# Patient Record
Sex: MALE | Race: WHITE | NOT HISPANIC OR LATINO | Employment: FULL TIME | RURAL
[De-identification: names, ages, dates, MRNs, and addresses within clinical notes are randomized per-mention and may not be internally consistent; named-entity substitution may affect disease eponyms.]

---

## 2023-01-23 ENCOUNTER — OFFICE VISIT (OUTPATIENT)
Dept: FAMILY MEDICINE CLINIC | Facility: CLINIC | Age: 28
End: 2023-01-23
Payer: COMMERCIAL

## 2023-01-23 VITALS
OXYGEN SATURATION: 100 % | HEART RATE: 102 BPM | SYSTOLIC BLOOD PRESSURE: 110 MMHG | DIASTOLIC BLOOD PRESSURE: 60 MMHG | BODY MASS INDEX: 30.87 KG/M2 | HEIGHT: 69 IN | RESPIRATION RATE: 16 BRPM | TEMPERATURE: 97.1 F | WEIGHT: 208.4 LBS

## 2023-01-23 DIAGNOSIS — Z00.00 ANNUAL PHYSICAL EXAM: Primary | ICD-10-CM

## 2023-01-23 DIAGNOSIS — R89.9 ABNORMAL LABORATORY TEST: ICD-10-CM

## 2023-01-23 DIAGNOSIS — Z11.59 ENCOUNTER FOR HEPATITIS C SCREENING TEST FOR LOW RISK PATIENT: ICD-10-CM

## 2023-01-23 DIAGNOSIS — G43.101 MIGRAINE WITH AURA AND WITH STATUS MIGRAINOSUS, NOT INTRACTABLE: ICD-10-CM

## 2023-01-23 PROCEDURE — 99385 PREV VISIT NEW AGE 18-39: CPT | Performed by: STUDENT IN AN ORGANIZED HEALTH CARE EDUCATION/TRAINING PROGRAM

## 2023-01-23 PROCEDURE — 99203 OFFICE O/P NEW LOW 30 MIN: CPT | Performed by: STUDENT IN AN ORGANIZED HEALTH CARE EDUCATION/TRAINING PROGRAM

## 2023-01-23 RX ORDER — SUMATRIPTAN 50 MG/1
TABLET, FILM COATED ORAL
Qty: 10 TABLET | Refills: 2 | Status: SHIPPED | OUTPATIENT
Start: 2023-01-23

## 2023-01-23 NOTE — PROGRESS NOTES
Chief Complaint   Patient presents with   • Annual Exam     Subjective   Clifford Calhoun is a 27 y.o. male here for his annual physical with me.   Clifford is here for coordination of medical care, to discuss health maintenance, disease prevention as well as to followup on medical problems.     Annual Physical Exam  Patient's last PE was 7 years ago.  Patient's medical history, medication list and allergy list were reviewed and updated  Activity level is heavy.   Exercises 7 per week.   Appetite is good.   Feels well with few complaints.   Energy level is fair.   Sleeps fairly well.   Patient's last colonoscopy was never.   Patient is doing routine self skin exam never.   Patient is doing routine testicle exams never.  -Patient's mother found out she is a carrier for hemochromatosis and is concerned for patient.  Mother wanted to make sure patient does not have hemochromatosis    Frequent migraines/headaches  - gets them on occasion, gets tunnel vision then gets a strong headache after that doesn't stop. Gets lightheadedness  - gets headaches often: several times a week  -Discussed doing a daily preventative for migraines versus as needed abortive treatment.  Patient preferred abortive treatment for now    The following portions of the patient's history were reviewed and updated as appropriate: allergies, current medications, past family history, past medical history, past social history, past surgical history and problem list.      Past Medical History :  Active Ambulatory Problems     Diagnosis Date Noted   • No Active Ambulatory Problems     Resolved Ambulatory Problems     Diagnosis Date Noted   • No Resolved Ambulatory Problems     No Additional Past Medical History       Medication List:    Current Outpatient Medications:   •  SUMAtriptan (Imitrex) 50 MG tablet, Take one tablet at onset of headache. May repeat dose one time in 2 hours if headache not relieved. Do not take more then 2 tablets in 24 hours, Disp: 10  "tablet, Rfl: 2    Allergies:  No Known Allergies    Social History:  Social History     Socioeconomic History   • Marital status: Single   Tobacco Use   • Smoking status: Never   • Smokeless tobacco: Never   Vaping Use   • Vaping Use: Never used   Substance and Sexual Activity   • Alcohol use: Yes     Comment: 1x week, 6 drinks per sitting   • Drug use: Never   • Sexual activity: Not Currently       Family History:  Family History   Problem Relation Age of Onset   • Migraine headaches Mother    • Hypertension Paternal Grandfather    • Diabetes Other        Past Surgical History:  Past Surgical History:   Procedure Laterality Date   • ANKLE SURGERY Right     x3 for tendon repair       PHQ-2 Depression Screening  Little interest or pleasure in doing things?     Feeling down, depressed, or hopeless?     PHQ-2 Total Score       Health Maintenance   Topic Date Due   • HEPATITIS C SCREENING  Never done   • COVID-19 Vaccine (1) 01/25/2023 (Originally 1995)   • INFLUENZA VACCINE  03/31/2023 (Originally 8/1/2022)   • ANNUAL PHYSICAL  01/23/2024   • TDAP/TD VACCINES (2 - Td or Tdap) 01/04/2031   • Pneumococcal Vaccine 0-64  Aged Out       Immunization History   Administered Date(s) Administered   • Tdap 01/04/2021         Review Of Systems:  Review of Systems      Physical Exam:  Vital Signs:  /60 (BP Location: Right arm, Patient Position: Sitting, Cuff Size: Large Adult)   Pulse 102   Temp 97.1 °F (36.2 °C) (Skin)   Resp 16   Ht 175.3 cm (69\")   Wt 94.5 kg (208 lb 6.4 oz)   SpO2 100%   BMI 30.78 kg/m²     Physical Exam  Constitutional:       General: He is not in acute distress.     Appearance: Normal appearance.   HENT:      Head: Normocephalic and atraumatic.      Right Ear: Tympanic membrane normal.      Left Ear: Tympanic membrane normal.      Nose: Nose normal.      Mouth/Throat:      Mouth: Mucous membranes are moist.      Pharynx: Oropharynx is clear. No posterior oropharyngeal erythema.   Eyes:      " Extraocular Movements: Extraocular movements intact.      Conjunctiva/sclera: Conjunctivae normal.   Cardiovascular:      Rate and Rhythm: Normal rate and regular rhythm.      Heart sounds: Normal heart sounds.   Pulmonary:      Effort: Pulmonary effort is normal.      Breath sounds: Normal breath sounds. No stridor. No wheezing.   Abdominal:      General: Abdomen is flat. Bowel sounds are normal.      Palpations: Abdomen is soft. There is no mass.      Tenderness: There is no abdominal tenderness. There is no guarding.   Musculoskeletal:         General: No swelling or deformity. Normal range of motion.      Cervical back: Normal range of motion and neck supple.   Skin:     General: Skin is warm and dry.      Capillary Refill: Capillary refill takes less than 2 seconds.      Coloration: Skin is not jaundiced.      Findings: No rash.   Neurological:      General: No focal deficit present.      Mental Status: He is alert and oriented to person, place, and time.      Cranial Nerves: No cranial nerve deficit.      Motor: No weakness.      Coordination: Coordination normal.      Gait: Gait normal.      Deep Tendon Reflexes: Reflexes normal.   Psychiatric:         Mood and Affect: Mood normal.         Behavior: Behavior normal.         Thought Content: Thought content normal.           Assessment and Plan:  Diagnoses and all orders for this visit:    1. Annual physical exam (Primary)  -Normal 27-year-old male physical exam    2. Migraine with aura and with status migrainosus, not intractable  -     SUMAtriptan (Imitrex) 50 MG tablet; Take one tablet at onset of headache. May repeat dose one time in 2 hours if headache not relieved. Do not take more then 2 tablets in 24 hours  Dispense: 10 tablet; Refill: 2  -Discussed daily preventative treatment versus abortive treatment.  Patient preferred to try abortive treatment for now.   -If patient is having to take sumatriptan hand every other day or several times a week,  recommended that we start amitriptyline daily.  Patient verbalized understanding    3. Abnormal laboratory test  -     CBC & Differential  -     Comprehensive Metabolic Panel  -     Iron Profile and ferritin to more confidently screen for possible hemochromatosis    4. BMI 30.0-30.9,adult  -     Lipid Panel With / Chol / HDL Ratio  -Patient exercises regularly and has larger amounts of muscle mass and low body fat.  BMI of 30.78 likely due to muscle mass rather than excess calories    5. Encounter for hepatitis C screening test for low risk patient  -     Hepatitis C Antibody      Follow-up  -1 year for annual physical    Discussed screening for common diseases.  Recommend yearly eye exams. Also discussed monitoring of blood pressure, lipids, blood sugars.      Colon cancer screening options discussed, including Colonoscopy, Cologuard and Hemoccult cards.  Occult blood testing negative in office today.  He was given additional information on the Cologuard testing and can contact the office if order desired.     I wore protective equipment throughout this patient encounter to include mask and eye protection. Hand hygiene was performed before donning protective equipment and after removal when leaving the room.

## 2023-01-26 ENCOUNTER — PATIENT ROUNDING (BHMG ONLY) (OUTPATIENT)
Dept: FAMILY MEDICINE CLINIC | Facility: CLINIC | Age: 28
End: 2023-01-26
Payer: COMMERCIAL

## 2023-01-26 NOTE — PROGRESS NOTES
January 26, 2023    Hello, may I speak with Clifford Calhoun?    My name is Jeanette      I am  with [facility]  [address]  [place] [address].    Before we get started may I verify your date of birth? 1995    I am calling to officially welcome you to our practice and ask about your recent visit. Is this a good time to talk? yes    Tell me about your visit with us. What things went well?  great visit       We're always looking for ways to make our patients' experiences even better. Do you have recommendations on ways we may improve?  no    Overall were you satisfied with your first visit to our practice? yes       I appreciate you taking the time to speak with me today. Is there anything else I can do for you? no      Thank you, and have a great day.

## 2023-04-30 LAB
ALBUMIN SERPL-MCNC: 4.6 G/DL (ref 4.1–5.2)
ALBUMIN/GLOB SERPL: 2.3 {RATIO} (ref 1.2–2.2)
ALP SERPL-CCNC: 45 IU/L (ref 44–121)
ALT SERPL-CCNC: 27 IU/L (ref 0–44)
AST SERPL-CCNC: 30 IU/L (ref 0–40)
BASOPHILS # BLD AUTO: 0 X10E3/UL (ref 0–0.2)
BASOPHILS NFR BLD AUTO: 0 %
BILIRUB SERPL-MCNC: 1.7 MG/DL (ref 0–1.2)
BUN SERPL-MCNC: 17 MG/DL (ref 6–20)
BUN/CREAT SERPL: 18 (ref 9–20)
CALCIUM SERPL-MCNC: 9.2 MG/DL (ref 8.7–10.2)
CHLORIDE SERPL-SCNC: 105 MMOL/L (ref 96–106)
CHOLEST SERPL-MCNC: 127 MG/DL (ref 100–199)
CHOLEST/HDLC SERPL: 2 RATIO (ref 0–5)
CO2 SERPL-SCNC: 24 MMOL/L (ref 20–29)
CREAT SERPL-MCNC: 0.97 MG/DL (ref 0.76–1.27)
EGFRCR SERPLBLD CKD-EPI 2021: 108 ML/MIN/1.73
EOSINOPHIL # BLD AUTO: 0.1 X10E3/UL (ref 0–0.4)
EOSINOPHIL NFR BLD AUTO: 1 %
ERYTHROCYTE [DISTWIDTH] IN BLOOD BY AUTOMATED COUNT: 11.8 % (ref 11.6–15.4)
FERRITIN SERPL-MCNC: 130 NG/ML (ref 30–400)
GLOBULIN SER CALC-MCNC: 2 G/DL (ref 1.5–4.5)
GLUCOSE SERPL-MCNC: 83 MG/DL (ref 70–99)
HCT VFR BLD AUTO: 45.1 % (ref 37.5–51)
HCV IGG SERPL QL IA: NON REACTIVE
HDLC SERPL-MCNC: 63 MG/DL
HGB BLD-MCNC: 16.2 G/DL (ref 13–17.7)
IMM GRANULOCYTES # BLD AUTO: 0 X10E3/UL (ref 0–0.1)
IMM GRANULOCYTES NFR BLD AUTO: 0 %
IRON SATN MFR SERPL: 75 % (ref 15–55)
IRON SERPL-MCNC: 206 UG/DL (ref 38–169)
LDLC SERPL CALC-MCNC: 55 MG/DL (ref 0–99)
LYMPHOCYTES # BLD AUTO: 2 X10E3/UL (ref 0.7–3.1)
LYMPHOCYTES NFR BLD AUTO: 57 %
MCH RBC QN AUTO: 32.1 PG (ref 26.6–33)
MCHC RBC AUTO-ENTMCNC: 35.9 G/DL (ref 31.5–35.7)
MCV RBC AUTO: 89 FL (ref 79–97)
MONOCYTES # BLD AUTO: 0.4 X10E3/UL (ref 0.1–0.9)
MONOCYTES NFR BLD AUTO: 10 %
NEUTROPHILS # BLD AUTO: 1.1 X10E3/UL (ref 1.4–7)
NEUTROPHILS NFR BLD AUTO: 32 %
PLATELET # BLD AUTO: 179 X10E3/UL (ref 150–450)
POTASSIUM SERPL-SCNC: 4.4 MMOL/L (ref 3.5–5.2)
PROT SERPL-MCNC: 6.6 G/DL (ref 6–8.5)
RBC # BLD AUTO: 5.05 X10E6/UL (ref 4.14–5.8)
SODIUM SERPL-SCNC: 142 MMOL/L (ref 134–144)
TIBC SERPL-MCNC: 275 UG/DL (ref 250–450)
TRIGL SERPL-MCNC: 32 MG/DL (ref 0–149)
UIBC SERPL-MCNC: 69 UG/DL (ref 111–343)
VLDLC SERPL CALC-MCNC: 9 MG/DL (ref 5–40)
WBC # BLD AUTO: 3.5 X10E3/UL (ref 3.4–10.8)

## 2023-05-04 ENCOUNTER — TELEPHONE (OUTPATIENT)
Dept: FAMILY MEDICINE CLINIC | Facility: CLINIC | Age: 28
End: 2023-05-04
Payer: COMMERCIAL

## 2023-05-04 ENCOUNTER — TELEPHONE (OUTPATIENT)
Dept: FAMILY MEDICINE CLINIC | Facility: CLINIC | Age: 28
End: 2023-05-04

## 2023-05-04 NOTE — TELEPHONE ENCOUNTER
I spoke with patient in regards to his iron being high on his labs. He states that no, he is not on any type of iron supplements. He did say that his mom has hemachromatosis and was concerned that he may have this as well. Do you want to do any further labs on him?

## 2023-05-04 NOTE — TELEPHONE ENCOUNTER
----- Message from Keerthi Oakley DO sent at 5/4/2023  5:55 AM EDT -----  Please let patient know that his iron came back high.  Is he taking a lot of iron supplements over-the-counter?  His electrolytes, kidney and liver enzymes are normal.  His white and red cells are normal.  His hepatitis C screen is negative.  His cholesterol looks excellent.

## 2023-05-04 NOTE — TELEPHONE ENCOUNTER
Called patient and left lab results on his voicemail. I told him to call me back if he is currently on any iron supplements

## 2023-05-05 ENCOUNTER — TELEPHONE (OUTPATIENT)
Dept: FAMILY MEDICINE CLINIC | Facility: CLINIC | Age: 28
End: 2023-05-05
Payer: COMMERCIAL

## 2023-06-07 ENCOUNTER — HOSPITAL ENCOUNTER (OUTPATIENT)
Dept: GENERAL RADIOLOGY | Facility: HOSPITAL | Age: 28
Discharge: HOME | End: 2023-06-07
Admitting: NURSE PRACTITIONER
Payer: COMMERCIAL

## 2023-06-07 ENCOUNTER — OFFICE VISIT (OUTPATIENT)
Dept: FAMILY MEDICINE CLINIC | Facility: CLINIC | Age: 28
End: 2023-06-07
Payer: COMMERCIAL

## 2023-06-07 VITALS
HEART RATE: 71 BPM | HEIGHT: 69 IN | WEIGHT: 200.2 LBS | BODY MASS INDEX: 29.65 KG/M2 | RESPIRATION RATE: 18 BRPM | DIASTOLIC BLOOD PRESSURE: 82 MMHG | TEMPERATURE: 97.9 F | SYSTOLIC BLOOD PRESSURE: 124 MMHG | OXYGEN SATURATION: 98 %

## 2023-06-07 DIAGNOSIS — G43.101 MIGRAINE WITH AURA AND WITH STATUS MIGRAINOSUS, NOT INTRACTABLE: Primary | ICD-10-CM

## 2023-06-07 DIAGNOSIS — M54.2 NECK PAIN: ICD-10-CM

## 2023-06-07 PROCEDURE — 72050 X-RAY EXAM NECK SPINE 4/5VWS: CPT

## 2023-06-07 RX ORDER — IBUPROFEN 800 MG/1
800 TABLET ORAL EVERY 8 HOURS PRN
Qty: 90 TABLET | Refills: 0 | Status: SHIPPED | OUTPATIENT
Start: 2023-06-07

## 2023-06-07 NOTE — PROGRESS NOTES
Chief Complaint  Headache (Comes and goes.  Is taking sumatriptan but does not help.  Is in back of head.  Pt states he does get migraines.  Has had success with 800mg ibuprofen.  Never seen neurology.)    Noe Horton is a 28 y.o. male  who presents to [facility]     Patient Care Team:  Keerthi Oakley DO as PCP - General (Family Medicine)     History of Present Illness  Clifford is here today for headaches  He reports he has headaches and he also has migraines.    His headaches are pain at base of skull /back of head    His migraines are pain at the back of head and also pain behind eyes.  He will have tunnel vision and vision changes before a migraine starts. He also has sensitivity to light and sound.  He will get a migraine about once a month.    He is here today for a headache with pain at base of skull and the pain radiates to back of head  Headache has been for about 1 week.  Started as a migraine and that lasted for about 2 days  He took a sumatriptan and it did not help.  Quality: throbbing  Severity: mild to moderate  Duration: for about 1 week  Timing: constant but pain waxes and wanes  Context: No recent injury  Alleviating factors: ibuprofen 800 mg helps  Aggravating factors: unsure what makes worse  Associated Symptoms: + aura    He has taken Excedrin migraine which did not help  Sumatriptan did not help.  He would like to see a specialist.    Saw a chiropractor in [place] about 1 year ago for neck pain  He had a neck xray.  Results of xray not available.      Clifford Calhoun  has a past medical history of Migraine with aura.      Review of Systems   Constitutional:  Negative for fever.   HENT:  Negative for congestion, ear pain and sore throat.    Eyes:  Positive for visual disturbance (before migraine).   Respiratory:  Negative for cough and shortness of breath.    Cardiovascular:  Negative for chest pain, palpitations and leg swelling.   Gastrointestinal:   "Negative for abdominal pain.   Musculoskeletal:  Negative for neck pain.   Neurological:  Positive for dizziness, light-headedness and headaches. Negative for tremors and weakness.      Family History   Problem Relation Age of Onset    Migraine headaches Mother     No Known Problems Father     No Known Problems Sister     Aortic aneurysm Maternal Grandfather     Coronary artery disease Paternal Grandmother     Hypertension Paternal Grandfather     Coronary artery disease Paternal Grandfather     Diabetes Other         Past Surgical History:   Procedure Laterality Date    ANKLE SURGERY Right     x3 for tendon repair        Social History     Socioeconomic History    Marital status: Single    Highest education level: High school graduate   Tobacco Use    Smoking status: Never    Smokeless tobacco: Never   Vaping Use    Vaping Use: Never used   Substance and Sexual Activity    Alcohol use: Yes     Comment: 1x week, 6 drinks per sitting    Drug use: Never    Sexual activity: Not Currently        Immunization History   Administered Date(s) Administered    Tdap 01/04/2021       Objective       Current Outpatient Medications:     ibuprofen (ADVIL,MOTRIN) 800 MG tablet, Take 1 tablet by mouth Every 8 (Eight) Hours As Needed for Mild Pain. With food, Disp: 90 tablet, Rfl: 0    SUMAtriptan (Imitrex) 50 MG tablet, Take one tablet at onset of headache. May repeat dose one time in 2 hours if headache not relieved. Do not take more then 2 tablets in 24 hours, Disp: 10 tablet, Rfl: 2    Vital Signs:      /82   Pulse 71   Temp 97.9 °F (36.6 °C) (Infrared)   Resp 18   Ht 175.3 cm (69.02\")   Wt 90.8 kg (200 lb 3.2 oz)   SpO2 98%   BMI 29.55 kg/m²     Vitals:    06/07/23 1512   BP: 124/82   Pulse: 71   Resp: 18   Temp: 97.9 °F (36.6 °C)   TempSrc: Infrared   SpO2: 98%   Weight: 90.8 kg (200 lb 3.2 oz)   Height: 175.3 cm (69.02\")      Physical Exam  Vitals reviewed.   Constitutional:       General: He is not in acute " distress.     Appearance: Normal appearance. He is not ill-appearing.   HENT:      Head: Normocephalic and atraumatic.      Right Ear: Tympanic membrane, ear canal and external ear normal.      Left Ear: Tympanic membrane, ear canal and external ear normal.      Mouth/Throat:      Mouth: Mucous membranes are moist.      Pharynx: Oropharynx is clear.   Eyes:      General: No scleral icterus.     Extraocular Movements: Extraocular movements intact.      Conjunctiva/sclera: Conjunctivae normal.      Pupils: Pupils are equal, round, and reactive to light.   Cardiovascular:      Rate and Rhythm: Normal rate and regular rhythm.      Heart sounds: Normal heart sounds.   Pulmonary:      Effort: Pulmonary effort is normal. No respiratory distress.      Breath sounds: Normal breath sounds. No wheezing.   Musculoskeletal:      Cervical back: Neck supple. No rigidity, spasms or tenderness. Normal range of motion.      Right lower leg: No edema.      Left lower leg: No edema.   Lymphadenopathy:      Cervical: No cervical adenopathy.   Skin:     General: Skin is warm and dry.   Neurological:      General: No focal deficit present.      Mental Status: He is alert and oriented to person, place, and time.      Cranial Nerves: Cranial nerves 2-12 are intact.      Sensory: Sensation is intact.      Motor: Motor function is intact.      Gait: Gait is intact.   Psychiatric:         Mood and Affect: Mood normal.         Behavior: Behavior normal.      Result Review :                   Assessment and Plan    Diagnoses and all orders for this visit:    1. Migraine with aura and with status migrainosus, not intractable (Primary)  Comments:  Keep a headache diary to determine migraine triggers.  Orders:  -     ibuprofen (ADVIL,MOTRIN) 800 MG tablet; Take 1 tablet by mouth Every 8 (Eight) Hours As Needed for Mild Pain. With food  Dispense: 90 tablet; Refill: 0  -     Ambulatory Referral to Neurology    2. Neck pain  -     XR Spine Cervical  Complete 4 or 5 View  -     ibuprofen (ADVIL,MOTRIN) 800 MG tablet; Take 1 tablet by mouth Every 8 (Eight) Hours As Needed for Mild Pain. With food  Dispense: 90 tablet; Refill: 0       Continue ibuprofen 800 mg every 8 hours as needed for pain.    Cervical spine xray ordered.  Refer to neurology.  Handouts on migraine headache and headache record form provided on after visit summary          Follow Up   Return for Follow up with your PCP Dr. Oakley for recheck.  Patient was given instructions and counseling regarding his condition or for health maintenance advice. Please see specific information pulled into the AVS if appropriate.    There are no Patient Instructions on file for this visit.

## 2023-11-08 ENCOUNTER — TELEPHONE (OUTPATIENT)
Dept: FAMILY MEDICINE CLINIC | Facility: CLINIC | Age: 28
End: 2023-11-08
Payer: COMMERCIAL

## 2023-11-08 DIAGNOSIS — R89.9 ABNORMAL LABORATORY TEST RESULT: Primary | ICD-10-CM

## 2023-11-09 NOTE — TELEPHONE ENCOUNTER
Spoke to pt 11/09/2023, 5:50 pm advised pt per Dr. Oakley it is time to repeat iron labs, orders have been placed

## 2024-02-27 ENCOUNTER — OFFICE VISIT (OUTPATIENT)
Dept: FAMILY MEDICINE CLINIC | Facility: CLINIC | Age: 29
End: 2024-02-27
Payer: COMMERCIAL

## 2024-02-27 ENCOUNTER — HOSPITAL ENCOUNTER (OUTPATIENT)
Dept: CARDIOLOGY | Facility: HOSPITAL | Age: 29
Discharge: HOME | End: 2024-02-27
Admitting: STUDENT IN AN ORGANIZED HEALTH CARE EDUCATION/TRAINING PROGRAM
Payer: COMMERCIAL

## 2024-02-27 VITALS
HEART RATE: 104 BPM | DIASTOLIC BLOOD PRESSURE: 82 MMHG | HEIGHT: 69 IN | BODY MASS INDEX: 28.44 KG/M2 | OXYGEN SATURATION: 99 % | RESPIRATION RATE: 16 BRPM | TEMPERATURE: 97.3 F | SYSTOLIC BLOOD PRESSURE: 120 MMHG | WEIGHT: 192 LBS

## 2024-02-27 DIAGNOSIS — Z13.29 THYROID DISORDER SCREEN: ICD-10-CM

## 2024-02-27 DIAGNOSIS — R00.2 PALPITATIONS: ICD-10-CM

## 2024-02-27 DIAGNOSIS — Z82.49 FAMILY HISTORY OF HEART DISEASE: ICD-10-CM

## 2024-02-27 DIAGNOSIS — Z00.00 ANNUAL PHYSICAL EXAM: Primary | ICD-10-CM

## 2024-02-27 DIAGNOSIS — R89.9 ABNORMAL LABORATORY TEST RESULT: ICD-10-CM

## 2024-02-27 DIAGNOSIS — E83.19 IRON OVERLOAD: ICD-10-CM

## 2024-02-27 DIAGNOSIS — R73.09 ELEVATED GLUCOSE: ICD-10-CM

## 2024-02-27 DIAGNOSIS — F10.10 EXCESSIVE DRINKING ALCOHOL: ICD-10-CM

## 2024-02-27 PROBLEM — G44.229 CHRONIC TENSION-TYPE HEADACHE, NOT INTRACTABLE: Status: ACTIVE | Noted: 2024-02-27

## 2024-02-27 PROCEDURE — 93246 EXT ECG>7D<15D RECORDING: CPT

## 2024-02-27 NOTE — PROGRESS NOTES
Chief Complaint  Chief Complaint   Patient presents with    Annual Exam       Subjective    History of Present Illness        Clifford Calhoun presents to [facility] for   Clifford Calhoun is a 28 y.o. male here for his annual physical with me. Clifford is here for coordination of medical care, to discuss health maintenance, disease prevention as well as to followup on medical problems.     Annual Physical Exam  Patient's last Physical Exam was 01/23/2023. Patient's medical history, medications and allergy lists were reviewed and updated.  Activity level is minimal.   Exercises a few times this week. Has had a cast on his foot for a month and hasn't been able to exercise  Appetite is good.   Feels fairly well with few complaints.   Energy level is good.   Sleeps well.   Patient's last colonoscopy was Never.   Patient is doing routine self skin exam occasionally.   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 got patient connected with Dr. Cody Rosenberg (orthopedic surgeon) who had patient in the cast initially, patient is now in a boot.  MRI is pending and patient has been requested to participate in physical therapy  -Patient states the surgeon told him that the last surgery that was done on him was considered a last resort to repairing his issue     Elevated iron  -Patient states that his mother has hemochromatosis.  We checked his iron on 4/29/2023, ferritin came back normal but iron came back elevated.  Had asked through [employer] at that time that he was taking any supplements.  We did not hear back  -Patient states that he is not taking any supplements for iron but does take several other supplements  "(patient will send us a list of the supplements)    Palpitations  -Patient recently developed the sensation of something \"fluttering\" in his chest.  When he points to his chest it is midsternal and he states the sensation will go up into his throat  -Due to patient's family history of heart issues, patient is a little concerned  -Patient is having palpitations about once a week now    Excessive alcohol drinking  -Patient does not drink very often.  He drinks once a week however when he does drink, he drinks about 6 alcoholic drinks at a time          Family History   Problem Relation Age of Onset    Migraine headaches Mother     No Known Problems Father     No Known Problems Sister     Aortic aneurysm Maternal Grandfather     Coronary artery disease Paternal Grandmother     Hypertension Paternal Grandfather     Coronary artery disease Paternal Grandfather     Diabetes Other        Social History     Tobacco Use    Smoking status: Never    Smokeless tobacco: Never   Vaping Use    Vaping Use: Never used   Substance Use Topics    Alcohol use: Yes     Comment: 1x week, 6 drinks per sitting    Drug use: Never       Past Surgical History:   Procedure Laterality Date    ANKLE SURGERY Right     1st surgery (2011), 2nd and 3rd surgeries (2015) repair of tendon on last 2    ELBOW FUSION Right 2000    broke elbow during fall       Patient Active Problem List   Diagnosis    Chronic tension-type headache, not intractable         Current Outpatient Medications:     ibuprofen (ADVIL,MOTRIN) 800 MG tablet, Take 1 tablet by mouth Every 8 (Eight) Hours As Needed for Mild Pain. With food, Disp: 30 tablet, Rfl: 0    SUMAtriptan (Imitrex) 50 MG tablet, Take one tablet at onset of headache. May repeat dose one time in 2 hours if headache not relieved. Do not take more then 2 tablets in 24 hours, Disp: 10 tablet, Rfl: 2    Objective   /82 (BP Location: Right arm, Patient Position: Sitting, Cuff Size: Adult)   Pulse 104   Temp 97.3 " "°F (36.3 °C) (Temporal)   Resp 16   Ht 175.3 cm (69.02\")   Wt 87.1 kg (192 lb)   SpO2 99% Comment: room air  BMI 28.34 kg/m²   BP Readings from Last 3 Encounters:   02/27/24 120/82   06/07/23 124/82   01/23/23 110/60     Wt Readings from Last 3 Encounters:   02/27/24 87.1 kg (192 lb)   06/07/23 90.8 kg (200 lb 3.2 oz)   01/23/23 94.5 kg (208 lb 6.4 oz)     Physical Exam  Constitutional:       General: He is not in acute distress.  HENT:      Head: Normocephalic and atraumatic.      Right Ear: Tympanic membrane normal.      Left Ear: Tympanic membrane normal.      Nose: Nose normal.      Mouth/Throat:      Mouth: Mucous membranes are moist.      Pharynx: Oropharynx is clear. No posterior oropharyngeal erythema.   Eyes:      Extraocular Movements: Extraocular movements intact.      Conjunctiva/sclera: Conjunctivae normal.   Neck:      Comments: - Thyroid not enlarged  Cardiovascular:      Rate and Rhythm: Normal rate and regular rhythm.      Heart sounds: Normal heart sounds.   Pulmonary:      Effort: Pulmonary effort is normal.      Breath sounds: Normal breath sounds. No stridor. No wheezing.   Abdominal:      General: Abdomen is flat. Bowel sounds are normal.      Palpations: Abdomen is soft. There is no mass.      Tenderness: There is no abdominal tenderness. There is no guarding.   Musculoskeletal:         General: Normal range of motion.      Cervical back: Normal range of motion and neck supple.      Comments: - Patient's right lower extremity is in a boot   Skin:     General: Skin is warm and dry.      Capillary Refill: Capillary refill takes less than 2 seconds.      Coloration: Skin is not jaundiced.      Findings: No rash.   Neurological:      General: No focal deficit present.      Mental Status: He is alert and oriented to person, place, and time.      Cranial Nerves: No cranial nerve deficit.      Motor: No weakness.      Coordination: Coordination normal.      Gait: Gait normal.      Deep Tendon " Reflexes: Reflexes normal.   Psychiatric:         Mood and Affect: Mood normal.         Behavior: Behavior normal.         Thought Content: Thought content normal.             Assessment and Plan   Diagnoses and all orders for this visit:    1. Annual physical exam (Primary)  -Normal 28-year-old male exam  -Pertinent screening labs ordered per below    2. Iron overload  -     Iron Profile  -     Ferritin    3. Palpitations  -     Holter Monitor - 72 Hour Up To 15 Days: Ordered it for 14 days.  Was placed at [place] location same-day    4. Family history of heart disease  -     Lipid panel    5. Abnormal laboratory test result  -     CBC & Differential  -     Comprehensive metabolic panel    6. Thyroid disorder screen  -     TSH Rfx On Abnormal To Free T4    7. Excessive drinking alcohol  -Recommended to patient to reduce the amount of drinks that he has to about 2-3 per sitting which should be much safer for occasional drinking    8. Elevated glucose  -     Hemoglobin A1c    9. BMI 28.0-28.9,adult  -Information about nutrition and standardized exercise recommendations added to patient's after visit summary           Follow Up   - 1 year for annual physical exam  -1 month to follow-up on palpitations      The patient was counseled regarding nutrition, physical activity, healthy weight, injury prevention, immunizations and preventative health screenings. Expected course, medications, and adverse effects discussed.  Call or return if worsening or persistent symptoms.  I wore protective equipment throughout this patient encounter including a mask and eye protection.   The complete contents of the Assessment and Plan and Data / Lab Results as documented above have been reviewed and addressed by myself with the patient today as part of an ongoing evaluation / treatment plan.

## 2024-02-27 NOTE — PATIENT INSTRUCTIONS
- Work up to 150 minutes of aerobic, moderate intensity (you can talk but you can't sing) or 75 minutes of vigorous activity of dedicated exercise a week  - 2 days a week, include resistance/weight training    Light intensity   - Ex: casual walking, light housework, stretching  Moderate Intensity   - brisk walking, water aerobics, ballroom dancing   - breathing will be a little harder with a faster heartbeat  Vigorous Intensity   - jogging/running, aerobic dancing    What are the benefits of movement?    Moving your body has many benefits. It can:    ?Burn calories, which helps people control their weight  ?Help control blood sugar levels in people with diabetes  ?Lower blood pressure, especially in people with high blood pressure  ?Lower stress and help with depression and anxiety  ?Keep bones strong, so they don't get thin and break easily  ?Lower the chance of dying from heart disease    Adding even small amounts of physical activity to your daily routine can improve your health.    What are the main types of exercise?    There are 3 main types of exercise. They are:    ?Aerobic exercise - Aerobic exercise raises a person's heart rate. Examples of aerobic exercise are walking, running, dancing, riding a bike, or swimming.  ?Resistance training - Resistance training helps make your muscles stronger. People can do this type of exercise using weights, exercise bands, or weight machines. You can also do this type of exercise using your own body weight, as with push-ups, or by lifting items in your home, like jugs of water.   ?Stretching - Stretching exercises help your muscles and joints move more easily.    It's important to have all 3 types of exercise in your exercise program. That way, your body, muscles, and joints can be as healthy as possible.    Should I talk to my doctor or nurse before I start exercising?    If you have not exercised before or have not exercised in a long time, talk with your doctor  or nurse before you start a very active exercise program.  If you have heart disease or risk factors for heart disease (like high blood pressure or diabetes), your doctor or nurse might recommend that you have an exercise test before starting an exercise program.  When you start an exercise program, start slowly. For example, do the exercise at a slow pace or for a few minutes only. Over time, you can exercise faster and for longer periods of time.  What should I do when I exercise? -- Each time you exercise, you should:    ?Warm up - Warming up can help keep you from hurting your muscles when you exercise. To warm up, do a light aerobic exercise (such as walking slowly) or stretch for 5 to 10 minutes.  ?Work out - You should try to get a mix of aerobic exercise, resistance training, and stretching. During an aerobic workout, you can walk fast, swim, run, or use an exercise machine, for example. Other activities, like dancing or playing tennis, are also forms of aerobic exercise. You should also take time to stretch all of your joints, including your neck, shoulders, back, hips, and knees. At least 2 times a week, you can do resistance training exercises as part of your workout.  ?Cool down - Cooling down helps keep you from feeling dizzy after you exercise and helps prevent muscle cramps. To cool down, you can stretch or do a light aerobic exercise for 5 minutes.    Some people go to a gym or do group exercise classes. But you can exercise even without these things. Some exercises can be done even in a small space. You can also try online videos or smartphone apps to get ideas for different types of exercise.     How often should I exercise?     Doctors recommend that people exercise at least 30 minutes a day, on 5 or more days of the week.  If you can't exercise for 30 minutes straight, try to exercise for 10 minutes at a time, 3 or 4 times a day. Even exercising for shorter amounts of time is good for you,  especially if it means spending less time sitting.     When should I call my doctor or nurse? -- If you have any of the following symptoms when you exercise, stop exercising and call your doctor or nurse right away:  ?Pain or pressure in your chest, arms, throat, jaw, or back  ?Nausea or vomiting  ?Feeling like your heart is fluttering or racing very fast  ?Feeling dizzy or faint    What if I don't have time to exercise?     Many people have very busy lives and might not think that they have time to exercise. But it's important to try to find time to exercise, even if you are tired or work a lot. Exercise can increase your energy level, which can make you feel better and might even help you get more work done.  Even if it's hard to set aside a lot of time to exercise, you can still improve your health by moving your body more. There are many ways that you can be more active. For example, you can:    ?Take the stairs instead of the elevator  ?Park in a parking space that is farther away from the door  ?Take a longer route when you walk from one place to another    Spending a lot of time sitting still - for example, watching television or working on the computer - can be bad for your health. Try to get up and move around whenever you can. Even small amounts of movement, like taking short walks, doing household chores, or gardening, can help improve your health. Finding activities you enjoy, or doing them with other people, can help you add more movement into your daily life.    What else should I do when I exercise?     To exercise safely and avoid problems, it's important to:    ?Drink fluids during and after exercising (but avoid drinks with a lot of caffeine or sugar)  ?Avoid exercising outside if it is too hot or cold  ?Wear layers of clothes, so that you can take them off if you get too hot  ?Wear shoes that fit well and support your feet  ?Be aware of your surroundings if you exercise outside

## 2024-03-08 DIAGNOSIS — G43.101 MIGRAINE WITH AURA AND WITH STATUS MIGRAINOSUS, NOT INTRACTABLE: ICD-10-CM

## 2024-03-08 DIAGNOSIS — M54.2 NECK PAIN: ICD-10-CM

## 2024-03-08 NOTE — TELEPHONE ENCOUNTER
Caller: Clifford Calhoun    Relationship: Self    Best call back number: [phone]     Requested Prescriptions:   Requested Prescriptions     Pending Prescriptions Disp Refills    ibuprofen (ADVIL,MOTRIN) 800 MG tablet 30 tablet 0     Sig: Take 1 tablet by mouth Every 8 (Eight) Hours As Needed for Mild Pain. With food        Pharmacy where request should be sent: [facility]Y #[id number] - [place], [place] - [address] - [phone] [facility] [phone] FX     Last office visit with prescribing clinician: 2/27/2024   Last telemedicine visit with prescribing clinician: Visit date not found   Next office visit with prescribing clinician: 3/28/2024     Additional details provided by patient: PATIENT IS OUT OF MEDICATION.    Does the patient have less than a 3 day supply:  [x] Yes  [] No    Would you like a call back once the refill request has been completed: [x] Yes [] No    If the office needs to give you a call back, can they leave a voicemail: [x] Yes [] No    Estelle Dias Rep   03/08/24 13:06 EST

## 2024-03-12 LAB
ALBUMIN SERPL-MCNC: 4.9 G/DL (ref 4.3–5.2)
ALBUMIN/GLOB SERPL: 2.5 {RATIO} (ref 1.2–2.2)
ALP SERPL-CCNC: 49 IU/L (ref 44–121)
ALT SERPL-CCNC: 20 IU/L (ref 0–44)
AST SERPL-CCNC: 20 IU/L (ref 0–40)
BASOPHILS # BLD AUTO: 0 X10E3/UL (ref 0–0.2)
BASOPHILS NFR BLD AUTO: 0 %
BILIRUB SERPL-MCNC: 0.8 MG/DL (ref 0–1.2)
BUN SERPL-MCNC: 17 MG/DL (ref 6–20)
BUN/CREAT SERPL: 22 (ref 9–20)
CALCIUM SERPL-MCNC: 9.4 MG/DL (ref 8.7–10.2)
CHLORIDE SERPL-SCNC: 106 MMOL/L (ref 96–106)
CHOLEST SERPL-MCNC: 145 MG/DL (ref 100–199)
CO2 SERPL-SCNC: 22 MMOL/L (ref 20–29)
CREAT SERPL-MCNC: 0.76 MG/DL (ref 0.76–1.27)
EGFRCR SERPLBLD CKD-EPI 2021: 124 ML/MIN/1.73
EOSINOPHIL # BLD AUTO: 0.1 X10E3/UL (ref 0–0.4)
EOSINOPHIL NFR BLD AUTO: 2 %
ERYTHROCYTE [DISTWIDTH] IN BLOOD BY AUTOMATED COUNT: 12.8 % (ref 11.6–15.4)
FERRITIN SERPL-MCNC: 177 NG/ML (ref 30–400)
GLOBULIN SER CALC-MCNC: 2 G/DL (ref 1.5–4.5)
GLUCOSE SERPL-MCNC: 93 MG/DL (ref 70–99)
HBA1C MFR BLD: 5 % (ref 4.8–5.6)
HCT VFR BLD AUTO: 47.7 % (ref 37.5–51)
HDLC SERPL-MCNC: 55 MG/DL
HGB BLD-MCNC: 16.1 G/DL (ref 13–17.7)
IMM GRANULOCYTES # BLD AUTO: 0 X10E3/UL (ref 0–0.1)
IMM GRANULOCYTES NFR BLD AUTO: 0 %
IRON SATN MFR SERPL: 32 % (ref 15–55)
IRON SERPL-MCNC: 89 UG/DL (ref 38–169)
LDLC SERPL CALC-MCNC: 79 MG/DL (ref 0–99)
LYMPHOCYTES # BLD AUTO: 2.7 X10E3/UL (ref 0.7–3.1)
LYMPHOCYTES NFR BLD AUTO: 56 %
MCH RBC QN AUTO: 31.1 PG (ref 26.6–33)
MCHC RBC AUTO-ENTMCNC: 33.8 G/DL (ref 31.5–35.7)
MCV RBC AUTO: 92 FL (ref 79–97)
MONOCYTES # BLD AUTO: 0.5 X10E3/UL (ref 0.1–0.9)
MONOCYTES NFR BLD AUTO: 10 %
NEUTROPHILS # BLD AUTO: 1.5 X10E3/UL (ref 1.4–7)
NEUTROPHILS NFR BLD AUTO: 32 %
PLATELET # BLD AUTO: 201 X10E3/UL (ref 150–450)
POTASSIUM SERPL-SCNC: 4.8 MMOL/L (ref 3.5–5.2)
PROT SERPL-MCNC: 6.9 G/DL (ref 6–8.5)
RBC # BLD AUTO: 5.17 X10E6/UL (ref 4.14–5.8)
SODIUM SERPL-SCNC: 143 MMOL/L (ref 134–144)
TIBC SERPL-MCNC: 281 UG/DL (ref 250–450)
TRIGL SERPL-MCNC: 50 MG/DL (ref 0–149)
TSH SERPL DL<=0.005 MIU/L-ACNC: 1.63 UIU/ML (ref 0.45–4.5)
UIBC SERPL-MCNC: 192 UG/DL (ref 111–343)
VLDLC SERPL CALC-MCNC: 11 MG/DL (ref 5–40)
WBC # BLD AUTO: 4.7 X10E3/UL (ref 3.4–10.8)

## 2024-03-18 ENCOUNTER — TELEPHONE (OUTPATIENT)
Dept: FAMILY MEDICINE CLINIC | Facility: CLINIC | Age: 29
End: 2024-03-18
Payer: COMMERCIAL

## 2024-03-18 NOTE — TELEPHONE ENCOUNTER
----- Message from Clifford Calhoun sent at 3/17/2024  8:54 AM EDT -----  Regarding: Blood test results   Contact: [phone]  Should i be concerned about a high BUN / Creatinine ratio and a high A/G ratio i know i had not had alot of fluids before cause i did it in the morning i may have not been drinking alot of water before either but those numbers are a bit concerning

## 2024-03-19 NOTE — TELEPHONE ENCOUNTER
I am not worried about these.  These levels being abnormal on their own do not really contribute to a particular diagnosis.

## 2024-03-26 PROBLEM — E66.3 OVERWEIGHT WITH BODY MASS INDEX (BMI) OF 28 TO 28.9 IN ADULT: Status: ACTIVE | Noted: 2024-03-26

## 2024-03-26 NOTE — PROGRESS NOTES
Subjective   Clifford Calhoun is a 28 y.o. male.   Chief Complaint   Patient presents with    Palpitations       History of Present Illness     Palpitations:   -Follow up from 2/27/2024: Patient recently developed a fluttering sensation in his chest that he can feel midsternum.  Having them about once a week.  -Pt completed a 14 day Holter Monitor study   -Holter monitor found that his heart was in sinus rhythm with a minimal rate of 44 bpm and a maximum rate of 166 bpm with an average rate of 76 bpm outside of patient's palpitation episodes  -During patient's palpitation episodes, the underlying rhythm was sinus but the heart rate would go from 84 bpm to 207 bpm    -Patient states that he has been taking preworkout since high school (which apparently has a lot of caffeine in it)    Right ankle injury  -Patient is having his right ankle cared for through [employer].  He is set to have surgery on 3/29/2024    Migraines  -Patient requesting refill of sumatriptan 50 mg    Neck pain  -Patient requesting refills of ibuprofen 800 mg for as needed use      The following portions of the patient's history were reviewed and updated as appropriate: allergies, current medications, past family history, past medical history, past social history, past surgical history, and problem list.    Patient Active Problem List   Diagnosis    Chronic tension-type headache, not intractable    Overweight with body mass index (BMI) of 28 to 28.9 in adult       Current Outpatient Medications on File Prior to Visit   Medication Sig Dispense Refill    [DISCONTINUED] ibuprofen (ADVIL,MOTRIN) 800 MG tablet Take 1 tablet by mouth Every 8 (Eight) Hours As Needed for Mild Pain. With food 30 tablet 0    [DISCONTINUED] SUMAtriptan (Imitrex) 50 MG tablet Take one tablet at onset of headache. May repeat dose one time in 2 hours if headache not relieved. Do not take more then 2 tablets in 24 hours 10 tablet 2     No current facility-administered medications  "on file prior to visit.     Current outpatient and discharge medications have been reconciled for the patient.  Reviewed by: Keerthi Oakley, DO      No Known Allergies      Objective   Visit Vitals  /76 (BP Location: Right arm, Patient Position: Sitting, Cuff Size: Adult)   Pulse 71   Temp 97.8 °F (36.6 °C) (Skin)   Resp 16   Ht 175.3 cm (69.02\")   Wt 88.4 kg (194 lb 12.8 oz)   SpO2 97%   BMI 28.75 kg/m²       Physical Exam  HENT:      Head: Normocephalic and atraumatic.   Eyes:      Conjunctiva/sclera: Conjunctivae normal.   Musculoskeletal:      Comments: - Patient wearing a boot on his right ankle   Neurological:      General: No focal deficit present.      Mental Status: He is alert and oriented to person, place, and time.   Psychiatric:         Mood and Affect: Mood normal.         Behavior: Behavior normal.           Diagnoses and all orders for this visit:    1. Palpitations (Primary)/Bradycardia  -Discussed with patient that the rhythm itself looks normal, the PVC burden is very rare.  The only things I am a little concerned about is that his minimum heart rate will get down to 44 bpm and during his episodes his maximum heart rate would get up to 207 bpm.  Told him he might be best to have cardiology look him over, patient agreeable  -  Ambulatory Referral to Cardiology: Dr Batres (gave patient number to call office tomorrow to see when he can get an appointment)    3. Injury of right ankle, subsequent encounter  -Patient concerned if his Holter monitor results will hamper his upcoming surgery tomorrow  -Told patient to call the surgery center and alert them of these findings and let them decide whether they feel that he safe for surgery or not.  -Told him that we will be able to get him an immediate appointment for cardiology before his surgery considering it so close    5. Migraine with aura and with status migrainosus, not intractable/Neck pain  -     ibuprofen (ADVIL,MOTRIN) 800 MG tablet; Take 1 " tablet by mouth Every 8 (Eight) Hours As Needed for Mild Pain. With food  Dispense: 30 tablet; Refill: 0  -     SUMAtriptan (Imitrex) 50 MG tablet; Take one tablet at onset of headache. May repeat dose one time in 2 hours if headache not relieved. Do not take more then 2 tablets in 24 hours  Dispense: 10 tablet; Refill: 2    7. Overweight with body mass index (BMI) of 28 to 28.9 in adult               Follow Up  -2/27/2025 or later for annual physical exam    Expected course, medications, and adverse effects discussed as appropriate.  Call or return if worsening or persistent symptoms. Hand hygiene was performed before donning protective equipment and after removal when leaving the room.    This document is intended for medical expert use only. Reading of this document by patients and/or patient's family without participating medical staff guidance may result in misinterpretation and unintended morbidity. Any interpretation of such data is the responsibility of the patient and/or family member responsible for the patient in concert with their primary or specialist providers, not to be left for sources of online searches such as [employer], [employer] or similar queries. Relying on these approaches to knowledge may result in misinterpretation, misguided goals of care and even death should patients or family members try recommendations outside of the realm of professional medical care.

## 2024-03-28 ENCOUNTER — OFFICE VISIT (OUTPATIENT)
Dept: FAMILY MEDICINE CLINIC | Facility: CLINIC | Age: 29
End: 2024-03-28
Payer: COMMERCIAL

## 2024-03-28 VITALS
DIASTOLIC BLOOD PRESSURE: 76 MMHG | HEIGHT: 69 IN | SYSTOLIC BLOOD PRESSURE: 124 MMHG | OXYGEN SATURATION: 97 % | WEIGHT: 194.8 LBS | BODY MASS INDEX: 28.85 KG/M2 | RESPIRATION RATE: 16 BRPM | HEART RATE: 71 BPM | TEMPERATURE: 97.8 F

## 2024-03-28 DIAGNOSIS — S99.911D INJURY OF RIGHT ANKLE, SUBSEQUENT ENCOUNTER: ICD-10-CM

## 2024-03-28 DIAGNOSIS — R00.1 BRADYCARDIA: ICD-10-CM

## 2024-03-28 DIAGNOSIS — E66.3 OVERWEIGHT WITH BODY MASS INDEX (BMI) OF 28 TO 28.9 IN ADULT: ICD-10-CM

## 2024-03-28 DIAGNOSIS — M54.2 NECK PAIN: ICD-10-CM

## 2024-03-28 DIAGNOSIS — G43.101 MIGRAINE WITH AURA AND WITH STATUS MIGRAINOSUS, NOT INTRACTABLE: ICD-10-CM

## 2024-03-28 DIAGNOSIS — R00.2 PALPITATIONS: Primary | ICD-10-CM

## 2024-03-28 RX ORDER — IBUPROFEN 800 MG/1
800 TABLET ORAL EVERY 8 HOURS PRN
Qty: 30 TABLET | Refills: 0 | Status: SHIPPED | OUTPATIENT
Start: 2024-03-28

## 2024-03-28 RX ORDER — SUMATRIPTAN 50 MG/1
TABLET, FILM COATED ORAL
Qty: 10 TABLET | Refills: 2 | Status: SHIPPED | OUTPATIENT
Start: 2024-03-28

## 2024-03-28 NOTE — PATIENT INSTRUCTIONS
- let [facility] know that your holter monitor found   - 84 bpm 207 bpm during episodes of palpitations  - Underlying rhythm is sinus rhythm with a minimal heart rate of 44 bpm maximal heart rate of 166 bpm average heart rate of 76 bpm

## 2024-03-29 ENCOUNTER — TELEPHONE (OUTPATIENT)
Dept: FAMILY MEDICINE CLINIC | Facility: CLINIC | Age: 29
End: 2024-03-29
Payer: COMMERCIAL

## 2024-03-29 NOTE — TELEPHONE ENCOUNTER
Caller: Clifford Calhoun    Relationship: Self    Best call back number: [phone]     PATIENT REQUESTING WE FAX HIS HEART HOLTER MONITOR RESULTS TO THE SURGEONS OFFICE:     FAX NUMBER [phone]

## 2024-03-29 NOTE — TELEPHONE ENCOUNTER
Spoke to pt 03/29/2024, 11:49 am pt requesting holter monitor results be faxed to Dr. Cody Tellez.    Records faxed 03/29/2024

## 2024-04-03 ENCOUNTER — PATIENT ROUNDING (BHMG ONLY) (OUTPATIENT)
Dept: CARDIOLOGY | Facility: CLINIC | Age: 29
End: 2024-04-03
Payer: COMMERCIAL

## 2024-04-03 ENCOUNTER — OFFICE VISIT (OUTPATIENT)
Dept: CARDIOLOGY | Facility: CLINIC | Age: 29
End: 2024-04-03
Payer: COMMERCIAL

## 2024-04-03 VITALS
HEART RATE: 85 BPM | RESPIRATION RATE: 18 BRPM | DIASTOLIC BLOOD PRESSURE: 86 MMHG | SYSTOLIC BLOOD PRESSURE: 130 MMHG | HEIGHT: 69 IN | BODY MASS INDEX: 28.58 KG/M2 | OXYGEN SATURATION: 97 % | WEIGHT: 193 LBS

## 2024-04-03 DIAGNOSIS — G44.229 CHRONIC TENSION-TYPE HEADACHE, NOT INTRACTABLE: ICD-10-CM

## 2024-04-03 DIAGNOSIS — R07.9 CHRONIC CHEST PAIN WITH LOW TO MODERATE RISK FOR CAD: Primary | ICD-10-CM

## 2024-04-03 DIAGNOSIS — E66.3 OVERWEIGHT WITH BODY MASS INDEX (BMI) OF 28 TO 28.9 IN ADULT: ICD-10-CM

## 2024-04-03 DIAGNOSIS — G89.29 CHRONIC CHEST PAIN WITH LOW TO MODERATE RISK FOR CAD: Primary | ICD-10-CM

## 2024-04-03 DIAGNOSIS — Z91.89 CHRONIC CHEST PAIN WITH LOW TO MODERATE RISK FOR CAD: Primary | ICD-10-CM

## 2024-04-03 NOTE — PROGRESS NOTES
A My-Chart message has been sent to the patient [place] [facility] [place]

## 2024-04-03 NOTE — PROGRESS NOTES
"      Subjective:     Encounter Date:04/03/2024      Patient ID: Clifford Calhoun is a 28 y.o. male.    Chief Complaint:  Chief Complaint   Patient presents with    NEW PATIENT       HPI:    28 years old male with no prior cardiac history presents to [facility] care.  Mentions occasional episodes of 'flutterin' in his chest , random in onset, usually lasts for a  few minutes, self resolution. Works out 3 times a week without worsening CP. Occasional radiation to throat with food. Recent ankle injury at work planned for ligament repair.  14 day monitor with no high grade AV block or sustained arrythmia    Objective:         /86 (BP Location: Left arm, Patient Position: Sitting, Cuff Size: Large Adult)   Pulse 85   Resp 18   Ht 175.3 cm (69\")   Wt 87.5 kg (193 lb)   SpO2 97%   BMI 28.50 kg/m²     Physical exam  General-no acute distress  CVS-S1-S2 normal, no murmur  Respiratory-clear breath sounds  GI-soft nontender abdomen  No pedal edema  Alert oriented X3    ROS:  14 point review of systems negative except as mentioned above    Current Outpatient Medications:     ibuprofen (ADVIL,MOTRIN) 800 MG tablet, Take 1 tablet by mouth Every 8 (Eight) Hours As Needed for Mild Pain. With food, Disp: 30 tablet, Rfl: 0    SUMAtriptan (Imitrex) 50 MG tablet, Take one tablet at onset of headache. May repeat dose one time in 2 hours if headache not relieved. Do not take more then 2 tablets in 24 hours, Disp: 10 tablet, Rfl: 2    Problem List:  Patient Active Problem List   Diagnosis    Chronic tension-type headache, not intractable    Overweight with body mass index (BMI) of 28 to 28.9 in adult     Past Medical History:  Past Medical History:   Diagnosis Date    Migraine with aura      Past Surgical History:  Past Surgical History:   Procedure Laterality Date    ANKLE SURGERY Right     1st surgery (2011), 2nd and 3rd surgeries (2015) repair of tendon on last 2    ELBOW FUSION Right 2000    broke elbow during fall     Social " History:  Social History     Socioeconomic History    Marital status: Single    Highest education level: High school graduate   Tobacco Use    Smoking status: Never     Passive exposure: Never    Smokeless tobacco: Never   Vaping Use    Vaping status: Never Used   Substance and Sexual Activity    Alcohol use: Yes     Comment: 1x week, 6 drinks per sitting    Drug use: Never    Sexual activity: Not Currently     Allergies:  No Known Allergies  Immunizations:  Immunization History   Administered Date(s) Administered    Tdap 01/04/2021            In-Office Procedure(s):  No orders to display        ASCVD RIsk Score::  The ASCVD Risk score (Samuel DK, et al., 2019) failed to calculate for the following reasons:    The 2019 ASCVD risk score is only valid for ages 40 to 79    Imaging:    Results for orders placed in visit on 06/07/23    XR Spine Cervical Complete 4 or 5 View    Narrative  XR SPINE CERVICAL COMPLETE 4 OR 5 VW    Date of Exam: 6/7/2023 4:27 PM EDT    Indication: Neck pain    Comparison: None available.    Findings:  There is no acute fracture or dislocation. The disc spaces are well-maintained. The facet joints are intact and normally aligned on both oblique views. The lateral exarticulation craniocervical junctions are intact. The prevertebral soft tissues are  unremarkable.    Impression  Impression:  No acute findings.      Electronically Signed: Jesus Flores  6/9/2023 4:28 PM EDT  Workstation ID: [id number]                 Most recent EKG as reviewed and interpreted by me:    ECG 12 Lead    Date/Time: 4/3/2024 5:07 PM  Performed by: Lora Soler MD    Authorized by: Lora Soler MD  Comparison: not compared with previous ECG   Rhythm: sinus rhythm             Assessment & Plan :       Pre operative cardiac risk assessment for ankle surgery  Low risk candidate for low risk surgery  Can do >4 METs without cardiac symptoms  14 day event monitor without sustained arrythmias  Plan  -no cardiac  contraindications to surgery, no further cardiac work up required    Palpitations/ chest/abdominal pain  Empirically treat as GERD with PPI  Check TTE  14 day event monitor without obvious etiology  Low risk for obstructive CAD, defer ischemic work up  Can work out 3 times a week without CP/THRASHER      Part of this note may be an electronic transcription/translation of spoken language to printed text using the Dragon Dictation System.    Lora Soler MD  04/03/24  .           Purse String (Simple) Text: Given the location of the defect and the characteristics of the surrounding skin a purse string closure was deemed most appropriate.  Undermining was performed circumfirentially around the surgical defect.  A purse string suture was then placed and tightened.

## 2024-04-04 ENCOUNTER — TELEPHONE (OUTPATIENT)
Dept: CARDIOLOGY | Facility: CLINIC | Age: 29
End: 2024-04-04
Payer: COMMERCIAL

## 2024-04-04 NOTE — TELEPHONE ENCOUNTER
Caller: DR DENNIS OFFICE    Relationship to patient:     Best call back number:  [phone]    Patient is needing: DR DENNIS OFFICE REQUESTING THE CLEARANCE IN [facility] FAXED [phone]. PATIENT IS HAVING SURGERY 4.5.24

## 2024-06-17 RX ORDER — IBUPROFEN 800 MG/1
800 TABLET ORAL EVERY 8 HOURS PRN
Qty: 30 TABLET | Refills: 1 | Status: SHIPPED | OUTPATIENT
Start: 2024-06-17

## 2024-09-09 ENCOUNTER — OFFICE VISIT (OUTPATIENT)
Dept: FAMILY MEDICINE CLINIC | Facility: CLINIC | Age: 29
End: 2024-09-09
Payer: COMMERCIAL

## 2024-09-09 VITALS
HEIGHT: 69 IN | BODY MASS INDEX: 29.38 KG/M2 | WEIGHT: 198.4 LBS | DIASTOLIC BLOOD PRESSURE: 64 MMHG | OXYGEN SATURATION: 99 % | TEMPERATURE: 97.1 F | HEART RATE: 75 BPM | SYSTOLIC BLOOD PRESSURE: 114 MMHG | RESPIRATION RATE: 16 BRPM

## 2024-09-09 DIAGNOSIS — R40.0 DAYTIME SLEEPINESS: ICD-10-CM

## 2024-09-09 DIAGNOSIS — R79.0 ABNORMAL SERUM IRON LEVEL: ICD-10-CM

## 2024-09-09 DIAGNOSIS — E55.9 VITAMIN D DEFICIENCY: ICD-10-CM

## 2024-09-09 DIAGNOSIS — R53.83 FATIGUE, UNSPECIFIED TYPE: ICD-10-CM

## 2024-09-09 DIAGNOSIS — E66.3 OVERWEIGHT WITH BODY MASS INDEX (BMI) OF 29 TO 29.9 IN ADULT: ICD-10-CM

## 2024-09-09 DIAGNOSIS — H93.12 TINNITUS OF LEFT EAR: Primary | ICD-10-CM

## 2024-09-09 PROCEDURE — 99213 OFFICE O/P EST LOW 20 MIN: CPT | Performed by: STUDENT IN AN ORGANIZED HEALTH CARE EDUCATION/TRAINING PROGRAM

## 2024-09-09 NOTE — PROGRESS NOTES
"Subjective   Clifford Calhoun is a 29 y.o. male.   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  States the rest of his team in the  also has hearing issues      Fatigue  -Patient having lower energy.  Will have periods of lower energy than others/fluctuates  -Patient requesting to have his testosterone checked  - sleeps 6-8 hours, sometimes wakes up feeling rested. Not sure that he snores  -[facility] but he was unable to fall asleep    The following portions of the patient's history were reviewed and updated as appropriate: allergies, current medications, past family history, past medical history, past social history, past surgical history, and problem list.    Patient Active Problem List   Diagnosis    Chronic tension-type headache, not intractable    Overweight with body mass index (BMI) of 28 to 28.9 in adult       Current Outpatient Medications on File Prior to Visit   Medication Sig Dispense Refill    ibuprofen (ADVIL,MOTRIN) 800 MG tablet Take 1 tablet by mouth Every 8 (Eight) Hours As Needed for Mild Pain. With food 30 tablet 1    SUMAtriptan (Imitrex) 50 MG tablet Take one tablet at onset of headache. May repeat dose one time in 2 hours if headache not relieved. Do not take more then 2 tablets in 24 hours (Patient not taking: Reported on 9/9/2024) 10 tablet 2     No current facility-administered medications on file prior to visit.     Current outpatient and discharge medications have been reconciled for the patient.  Reviewed by: Keerthi Oakley DO      No Known Allergies      Objective   Visit Vitals  /64 (BP Location: Left arm, Patient Position: Sitting, Cuff Size: Large Adult)   Pulse 75 " "  Temp 97.1 °F (36.2 °C) (Skin)   Resp 16   Ht 175.3 cm (69\")   Wt 90 kg (198 lb 6.4 oz)   SpO2 99%   BMI 29.30 kg/m²       Physical Exam  HENT:      Head: Normocephalic and atraumatic.      Right Ear: Tympanic membrane normal.      Left Ear: Tympanic membrane normal.   Eyes:      Conjunctiva/sclera: Conjunctivae normal.   Neurological:      General: No focal deficit present.      Mental Status: He is alert and oriented to person, place, and time.   Psychiatric:         Mood and Affect: Mood normal.         Behavior: Behavior normal.           Diagnoses and all orders for this visit:    1. Tinnitus of left ear (Primary)  -     Ambulatory Referral to ENT (Otolaryngology): [facility] in [place]  -Discussed with patient that with a history of loud noises, certain parts of the ear that  frequencies can wear out faster.  Told him it would be worthwhile at least to have the structure of his ear evaluated by ENT to see if there is anything that  can be done.  However, had a discussion with patient that often times is not much that can be done for tinnitus.  Patient states that he was aware.    2. Fatigue, unspecified type  -     Testosterone  -     Vitamin D,25-Hydroxy  -     Vitamin B12  -     TSH Rfx On Abnormal To Free T4  -     Iron Profile  -     Ferritin  -     CBC & Differential  -     Comprehensive metabolic panel  -     Ambulatory Referral to Sleep Medicine: [facility]    3. Vitamin D deficiency  -     Vitamin D,25-Hydroxy    4. Abnormal serum iron level  -     Iron Profile  -     Ferritin    5. Daytime sleepiness  -     Ambulatory Referral to Sleep Medicine: [facility]    6. Overweight with body mass index (BMI) of 29 to 29.9 in adult           Follow Up  -2/28/2025 for annual physical exam    Expected course, medications, and adverse effects discussed as appropriate.  Call or return if worsening or persistent symptoms. Hand hygiene was performed before donning protective equipment " and after removal when leaving the room.    This document is intended for medical expert use only. Reading of this document by patients and/or patient's family without participating medical staff guidance may result in misinterpretation and unintended morbidity. Any interpretation of such data is the responsibility of the patient and/or family member responsible for the patient in concert with their primary or specialist providers, not to be left for sources of online searches such as [employer], [employer] or similar queries. Relying on these approaches to knowledge may result in misinterpretation, misguided goals of care and even death should patients or family members try recommendations outside of the realm of professional medical care.

## 2024-09-10 LAB
25(OH)D3+25(OH)D2 SERPL-MCNC: 40.3 NG/ML (ref 30–100)
ALBUMIN SERPL-MCNC: 4.8 G/DL (ref 4.1–5.1)
ALP SERPL-CCNC: 49 IU/L (ref 44–121)
ALT SERPL-CCNC: 21 IU/L (ref 0–44)
AST SERPL-CCNC: 25 IU/L (ref 0–40)
BASOPHILS # BLD AUTO: 0 X10E3/UL (ref 0–0.2)
BASOPHILS NFR BLD AUTO: 0 %
BILIRUB SERPL-MCNC: 1.1 MG/DL (ref 0–1.2)
BUN SERPL-MCNC: 19 MG/DL (ref 6–20)
BUN/CREAT SERPL: 20 (ref 9–20)
CALCIUM SERPL-MCNC: 9.6 MG/DL (ref 8.7–10.2)
CHLORIDE SERPL-SCNC: 104 MMOL/L (ref 96–106)
CO2 SERPL-SCNC: 25 MMOL/L (ref 20–29)
CREAT SERPL-MCNC: 0.93 MG/DL (ref 0.76–1.27)
EGFRCR SERPLBLD CKD-EPI 2021: 113 ML/MIN/1.73
EOSINOPHIL # BLD AUTO: 0.1 X10E3/UL (ref 0–0.4)
EOSINOPHIL NFR BLD AUTO: 2 %
ERYTHROCYTE [DISTWIDTH] IN BLOOD BY AUTOMATED COUNT: 12.4 % (ref 11.6–15.4)
FERRITIN SERPL-MCNC: 224 NG/ML (ref 30–400)
GLOBULIN SER CALC-MCNC: 2.2 G/DL (ref 1.5–4.5)
GLUCOSE SERPL-MCNC: 93 MG/DL (ref 70–99)
HCT VFR BLD AUTO: 51.3 % (ref 37.5–51)
HGB BLD-MCNC: 17 G/DL (ref 13–17.7)
IMM GRANULOCYTES # BLD AUTO: 0 X10E3/UL (ref 0–0.1)
IMM GRANULOCYTES NFR BLD AUTO: 0 %
IRON SATN MFR SERPL: 59 % (ref 15–55)
IRON SERPL-MCNC: 158 UG/DL (ref 38–169)
LYMPHOCYTES # BLD AUTO: 2.4 X10E3/UL (ref 0.7–3.1)
LYMPHOCYTES NFR BLD AUTO: 51 %
MCH RBC QN AUTO: 31.8 PG (ref 26.6–33)
MCHC RBC AUTO-ENTMCNC: 33.1 G/DL (ref 31.5–35.7)
MCV RBC AUTO: 96 FL (ref 79–97)
MONOCYTES # BLD AUTO: 0.4 X10E3/UL (ref 0.1–0.9)
MONOCYTES NFR BLD AUTO: 9 %
NEUTROPHILS # BLD AUTO: 1.8 X10E3/UL (ref 1.4–7)
NEUTROPHILS NFR BLD AUTO: 38 %
PLATELET # BLD AUTO: 186 X10E3/UL (ref 150–450)
POTASSIUM SERPL-SCNC: 4.4 MMOL/L (ref 3.5–5.2)
PROT SERPL-MCNC: 7 G/DL (ref 6–8.5)
RBC # BLD AUTO: 5.35 X10E6/UL (ref 4.14–5.8)
SODIUM SERPL-SCNC: 143 MMOL/L (ref 134–144)
TESTOST SERPL-MCNC: 920 NG/DL (ref 264–916)
TIBC SERPL-MCNC: 267 UG/DL (ref 250–450)
TSH SERPL DL<=0.005 MIU/L-ACNC: 1.22 UIU/ML (ref 0.45–4.5)
UIBC SERPL-MCNC: 109 UG/DL (ref 111–343)
VIT B12 SERPL-MCNC: 739 PG/ML (ref 232–1245)
WBC # BLD AUTO: 4.7 X10E3/UL (ref 3.4–10.8)

## 2024-09-16 ENCOUNTER — TELEPHONE (OUTPATIENT)
Dept: FAMILY MEDICINE CLINIC | Facility: CLINIC | Age: 29
End: 2024-09-16
Payer: COMMERCIAL

## 2024-09-24 ENCOUNTER — OFFICE VISIT (OUTPATIENT)
Dept: FAMILY MEDICINE CLINIC | Facility: CLINIC | Age: 29
End: 2024-09-24
Payer: COMMERCIAL

## 2024-09-24 VITALS
BODY MASS INDEX: 29.38 KG/M2 | RESPIRATION RATE: 16 BRPM | WEIGHT: 198.4 LBS | TEMPERATURE: 98.1 F | OXYGEN SATURATION: 99 % | HEART RATE: 65 BPM | HEIGHT: 69 IN

## 2024-09-24 DIAGNOSIS — R79.0 ABNORMAL BLOOD LEVEL OF IRON: Primary | ICD-10-CM

## 2024-09-24 DIAGNOSIS — E66.3 OVERWEIGHT WITH BODY MASS INDEX (BMI) OF 29 TO 29.9 IN ADULT: ICD-10-CM

## 2024-09-24 DIAGNOSIS — Z83.49 FAMILY HISTORY OF HEMOCHROMATOSIS: ICD-10-CM

## 2024-09-24 PROCEDURE — 99213 OFFICE O/P EST LOW 20 MIN: CPT | Performed by: STUDENT IN AN ORGANIZED HEALTH CARE EDUCATION/TRAINING PROGRAM

## 2025-01-15 ENCOUNTER — CONSULT (OUTPATIENT)
Dept: ONCOLOGY | Facility: CLINIC | Age: 30
End: 2025-01-15
Payer: COMMERCIAL

## 2025-01-15 VITALS
WEIGHT: 198 LBS | BODY MASS INDEX: 29.33 KG/M2 | RESPIRATION RATE: 18 BRPM | HEART RATE: 84 BPM | SYSTOLIC BLOOD PRESSURE: 117 MMHG | HEIGHT: 69 IN | DIASTOLIC BLOOD PRESSURE: 80 MMHG | TEMPERATURE: 97.1 F

## 2025-01-15 DIAGNOSIS — Z83.49 FAMILY HISTORY OF HEMOCHROMATOSIS: Primary | ICD-10-CM

## 2025-01-15 PROBLEM — F41.9 ANXIETY: Status: ACTIVE | Noted: 2025-01-15

## 2025-01-15 NOTE — LETTER
January 15, 2025     Keerthi Oakley DO  [address]  [address]  [place] [address]    Patient: Clifford Calhoun   YOB: 1995   Date of Visit: 1/15/2025     Dear Keerthi Oakley DO:       Thank you for referring Clifford Calhoun to me for evaluation. Below are the relevant portions of my assessment and plan of care.    If you have questions, please do not hesitate to call me. I look forward to following Clifford along with you.         Sincerely,        Susi Hassan MD        CC: No Recipients    Susi Hassan MD  01/15/25 1203  Sign when Signing Visit   Hematology/Oncology Outpatient Consultation    Patient name: Clifford Calhoun  : 1995  MRN: [id number]  Primary Care Physician: Keerthi Oakley DO  Referring Physician: Keerthi Oakley DO  Reason For Consult:     Chief Complaint   Patient presents with   • Appointment     Family history of hemochromatosis       History of Present Illness:      This is a 29-year-old male who has referred secondary to family history of hemochromatosis.  Patient said that his mother has the hemochromatosis gene and has had high iron levels.  Patient is not aware of his family with his father's hematologic history.  He is here to be screened to see whether he is a carrier for hemochromatosis gene.      He has a longtime history of fatigue, joint aches and pains and describes what appears to be patches on his beard.  He has no personal history of autoimmune disease and he is not aware of any family history of autoimmune disorders.    He has labs in 2024 his ferritin was normal at 224 serum iron saturation was slightly elevated to 59% normal being 55% but serum iron was normal.  Additional labs include testosterone which was normal at 920, vitamin D level was normal, B12 was normal thyroid function test was normal his white count was 4.7, hemoglobin 17 platelets 186 with essentially unremarkable differentials.      Patient works as an  ,  He does not smoke and does not drink alcohol      Past Medical History:   Diagnosis Date   • Migraine with aura        Past Surgical History:   Procedure Laterality Date   • ANKLE SURGERY Right     1st surgery (2011), 2nd and 3rd surgeries (2015) repair of tendon on last 2   • ELBOW FUSION Right 2000    broke elbow during fall         Current Outpatient Medications:   •  ibuprofen (ADVIL,MOTRIN) 800 MG tablet, Take 1 tablet by mouth Every 8 (Eight) Hours As Needed for Mild Pain. With food, Disp: 30 tablet, Rfl: 1  •  SUMAtriptan (Imitrex) 50 MG tablet, Take one tablet at onset of headache. May repeat dose one time in 2 hours if headache not relieved. Do not take more then 2 tablets in 24 hours (Patient not taking: Reported on 1/15/2025), Disp: 10 tablet, Rfl: 2    No Known Allergies    Immunization History   Administered Date(s) Administered   • Tdap 01/04/2021       Family History   Problem Relation Age of Onset   • Migraine headaches Mother    • Other (Other) Mother         hemochromatosis carrier   • No Known Problems Father    • No Known Problems Sister    • Aortic aneurysm Maternal Grandfather    • Coronary artery disease Paternal Grandmother    • Hypertension Paternal Grandfather    • Coronary artery disease Paternal Grandfather    • Diabetes Other        Cancer-related family history is not on file.    Social History     Tobacco Use   • Smoking status: Never     Passive exposure: Never   • Smokeless tobacco: Never   Vaping Use   • Vaping status: Never Used   Substance Use Topics   • Alcohol use: Yes     Comment: 1x week, 6 drinks per sitting   • Drug use: Never       ROS:    Review of Systems   Constitutional:  Positive for fatigue. Negative for chills and fever.   HENT:  Negative for congestion, drooling, ear discharge, rhinorrhea, sinus pressure and tinnitus.    Eyes:  Negative for photophobia, pain and discharge.   Respiratory:  Negative for apnea, choking and stridor.    Cardiovascular:   Negative for palpitations.   Gastrointestinal:  Negative for abdominal distention, abdominal pain and anal bleeding.   Endocrine: Negative for polydipsia and polyphagia.   Genitourinary:  Negative for decreased urine volume, flank pain and genital sores.   Musculoskeletal:  Negative for gait problem, neck pain and neck stiffness.   Skin:  Negative for color change, rash and wound.   Neurological:  Negative for tremors, seizures, syncope, facial asymmetry and speech difficulty.   Hematological:  Negative for adenopathy.   Psychiatric/Behavioral:  Negative for agitation, confusion, hallucinations and self-injury. The patient is not hyperactive.        Objective:    There were no vitals filed for this visit.  There is no height or weight on file to calculate BMI.    ECOG  (0) Fully active, able to carry on all predisease performance without restriction    Physical Exam:  Physical Exam  Vitals and nursing note reviewed.   Constitutional:       General: He is not in acute distress.     Appearance: He is not diaphoretic.   HENT:      Head: Normocephalic and atraumatic.   Eyes:      General: No scleral icterus.        Right eye: No discharge.         Left eye: No discharge.      Conjunctiva/sclera: Conjunctivae normal.   Neck:      Thyroid: No thyromegaly.   Cardiovascular:      Rate and Rhythm: Normal rate and regular rhythm.      Heart sounds: Normal heart sounds.      No friction rub. No gallop.   Pulmonary:      Effort: Pulmonary effort is normal. No respiratory distress.      Breath sounds: No stridor. No wheezing.   Abdominal:      General: Bowel sounds are normal.      Palpations: Abdomen is soft. There is no mass.      Tenderness: There is no abdominal tenderness. There is no guarding or rebound.   Musculoskeletal:         General: No tenderness. Normal range of motion.      Cervical back: Normal range of motion and neck supple.   Lymphadenopathy:      Cervical: No cervical adenopathy.   Skin:     General: Skin is  warm.      Findings: No erythema or rash.   Neurological:      Mental Status: He is alert and oriented to person, place, and time.      Motor: No abnormal muscle tone.   Psychiatric:         Behavior: Behavior normal.         RECENT LABS  WBC   Date Value Ref Range Status   09/09/2024 4.7 3.4 - 10.8 x10E3/uL Final   03/18/2024 4.67 4.5 - 11.0 10*3/uL Final     RBC   Date Value Ref Range Status   09/09/2024 5.35 4.14 - 5.80 x10E6/uL Final   03/18/2024 5.29 4.5 - 5.9 10*6/uL Final     Hemoglobin   Date Value Ref Range Status   09/09/2024 17.0 13.0 - 17.7 g/dL Final   03/18/2024 16.6 13.5 - 17.5 g/dL Final     Hematocrit   Date Value Ref Range Status   09/09/2024 51.3 (H) 37.5 - 51.0 % Final   03/18/2024 47.0 41.0 - 53.0 % Final     MCV   Date Value Ref Range Status   09/09/2024 96 79 - 97 fL Final   03/18/2024 88.8 80.0 - 100.0 fL Final     MCH   Date Value Ref Range Status   09/09/2024 31.8 26.6 - 33.0 pg Final   03/18/2024 31.4 26.0 - 34.0 pg Final     MCHC   Date Value Ref Range Status   09/09/2024 33.1 31.5 - 35.7 g/dL Final   03/18/2024 35.3 31.0 - 37.0 g/dL Final     RDW   Date Value Ref Range Status   09/09/2024 12.4 11.6 - 15.4 % Final   03/18/2024 12.3 12.0 - 16.8 % Final     MPV   Date Value Ref Range Status   03/18/2024 9.0 8.4 - 12.4 fL Final     Platelets   Date Value Ref Range Status   09/09/2024 186 150 - 450 x10E3/uL Final   03/18/2024 213 140 - 440 10*3/uL Final     Neutrophil Rel %   Date Value Ref Range Status   09/09/2024 38 Not Estab. % Final   03/18/2024 43.0 (L) 45 - 80 % Final     Lymphocyte Rel %   Date Value Ref Range Status   09/09/2024 51 Not Estab. % Final   03/18/2024 46.7 15 - 50 % Final     Monocyte Rel %   Date Value Ref Range Status   09/09/2024 9 Not Estab. % Final   03/18/2024 9.0 0 - 15 % Final     Eosinophil Rel %   Date Value Ref Range Status   09/09/2024 2 Not Estab. % Final     Eosinophil %   Date Value Ref Range Status   03/18/2024 0.9 0 - 7 % Final     Basophil Rel %   Date  Value Ref Range Status   09/09/2024 0 Not Estab. % Final   03/18/2024 0.2 0 - 2 % Final     Immature Grans %   Date Value Ref Range Status   03/18/2024 0.2 0.0 - 1.0 % Final     Neutrophils Absolute   Date Value Ref Range Status   09/09/2024 1.8 1.4 - 7.0 x10E3/uL Final   03/18/2024 2.01 2.0 - 8.8 10*3/uL Final     Lymphocytes Absolute   Date Value Ref Range Status   09/09/2024 2.4 0.7 - 3.1 x10E3/uL Final   03/18/2024 2.18 0.7 - 5.5 10*3/uL Final     Monocytes Absolute   Date Value Ref Range Status   09/09/2024 0.4 0.1 - 0.9 x10E3/uL Final   03/18/2024 0.42 0.0 - 1.7 10*3/uL Final     Eosinophils Absolute   Date Value Ref Range Status   09/09/2024 0.1 0.0 - 0.4 x10E3/uL Final   03/18/2024 0.04 0.0 - 0.8 10*3/uL Final     Basophils Absolute   Date Value Ref Range Status   09/09/2024 0.0 0.0 - 0.2 x10E3/uL Final   03/18/2024 0.01 0.0 - 0.2 10*3/uL Final     Immature Grans, Absolute   Date Value Ref Range Status   03/18/2024 0.01 0.00 - 0.10 10*3/uL Final     nRBC   Date Value Ref Range Status   03/18/2024 0 0 /100(WBC) Final       Lab Results   Component Value Date    GLUCOSE 93 09/09/2024    BUN 19 09/09/2024    CREATININE 0.93 09/09/2024    BCR 20 09/09/2024    K 4.4 09/09/2024    CO2 25 09/09/2024    CALCIUM 9.6 09/09/2024    PROTENTOTREF 7.0 09/09/2024    ALBUMIN 4.8 09/09/2024    LABIL2 2.5 (H) 03/11/2024    AST 25 09/09/2024    ALT 21 09/09/2024         Assessment & Plan  There are no diagnoses linked to this encounter.    Family history of hemochromatosis  Mildly elevated serum iron but normal ferritin and iron saturation  Chronic fatigue: Patient has had workup through his PCP with normal testosterone vitamin D, B12 and thyroid function tests      Plans          I reviewed hemochromatosis in detail with him  Reviewed of his iron studies. These  do not indicate high suspicion for hemochromatosis but would need genetic evaluation to rule that out.  I have included CBC with differential, iron studies with  ferritin and HFE analysis  Given the symptom complex that he has, if his workup is unremarkable will recommend or consider rheumatology referral.  Follow-up in 2 to 4 weeks to review  All questions answered        I spent 45 total minutes, face-to-face, caring for Clifford today. 90% of this time involved counseling and/or coordination of care as documented within this note.       Electronically signed by Susi Hassan MD, 01/15/25, 12:03 PM EST.

## 2025-01-15 NOTE — PROGRESS NOTES
Hematology/Oncology Outpatient Consultation    Patient name: Cliffodr Calhoun  : 1995  MRN: [id number]  Primary Care Physician: Keerthi Oakley DO  Referring Physician: Keerthi Oakley DO  Reason For Consult:     Chief Complaint   Patient presents with    Appointment     Family history of hemochromatosis       History of Present Illness:      This is a 29-year-old male who has referred secondary to family history of hemochromatosis.  Patient said that his mother has the hemochromatosis gene and has had high iron levels.  Patient is not aware of his family with his father's hematologic history.  He is here to be screened to see whether he is a carrier for hemochromatosis gene.      He has a longtime history of fatigue, joint aches and pains and describes what appears to be patches on his beard.  He has no personal history of autoimmune disease and he is not aware of any family history of autoimmune disorders.    He has labs in 2024 his ferritin was normal at 224 serum iron saturation was slightly elevated to 59% normal being 55% but serum iron was normal.  Additional labs include testosterone which was normal at 920, vitamin D level was normal, B12 was normal thyroid function test was normal his white count was 4.7, hemoglobin 17 platelets 186 with essentially unremarkable differentials.      Patient works as an ,  He does not smoke and does not drink alcohol      Past Medical History:   Diagnosis Date    Migraine with aura        Past Surgical History:   Procedure Laterality Date    ANKLE SURGERY Right     1st surgery (), 2nd and 3rd surgeries () repair of tendon on last 2    ELBOW FUSION Right     broke elbow during fall         Current Outpatient Medications:     ibuprofen (ADVIL,MOTRIN) 800 MG tablet, Take 1 tablet by mouth Every 8 (Eight) Hours As Needed for Mild Pain. With food, Disp: 30 tablet, Rfl: 1    SUMAtriptan (Imitrex) 50 MG tablet, Take one tablet at onset of  headache. May repeat dose one time in 2 hours if headache not relieved. Do not take more then 2 tablets in 24 hours (Patient not taking: Reported on 1/15/2025), Disp: 10 tablet, Rfl: 2    No Known Allergies    Immunization History   Administered Date(s) Administered    Tdap 01/04/2021       Family History   Problem Relation Age of Onset    Migraine headaches Mother     Other (Other) Mother         hemochromatosis carrier    No Known Problems Father     No Known Problems Sister     Aortic aneurysm Maternal Grandfather     Coronary artery disease Paternal Grandmother     Hypertension Paternal Grandfather     Coronary artery disease Paternal Grandfather     Diabetes Other        Cancer-related family history is not on file.    Social History     Tobacco Use    Smoking status: Never     Passive exposure: Never    Smokeless tobacco: Never   Vaping Use    Vaping status: Never Used   Substance Use Topics    Alcohol use: Yes     Comment: 1x week, 6 drinks per sitting    Drug use: Never       ROS:    Review of Systems   Constitutional:  Positive for fatigue. Negative for chills and fever.   HENT:  Negative for congestion, drooling, ear discharge, rhinorrhea, sinus pressure and tinnitus.    Eyes:  Negative for photophobia, pain and discharge.   Respiratory:  Negative for apnea, choking and stridor.    Cardiovascular:  Negative for palpitations.   Gastrointestinal:  Negative for abdominal distention, abdominal pain and anal bleeding.   Endocrine: Negative for polydipsia and polyphagia.   Genitourinary:  Negative for decreased urine volume, flank pain and genital sores.   Musculoskeletal:  Negative for gait problem, neck pain and neck stiffness.   Skin:  Negative for color change, rash and wound.   Neurological:  Negative for tremors, seizures, syncope, facial asymmetry and speech difficulty.   Hematological:  Negative for adenopathy.   Psychiatric/Behavioral:  Negative for agitation, confusion, hallucinations and self-injury.  The patient is not hyperactive.        Objective:    There were no vitals filed for this visit.  There is no height or weight on file to calculate BMI.    ECOG  (0) Fully active, able to carry on all predisease performance without restriction    Physical Exam:  Physical Exam  Vitals and nursing note reviewed.   Constitutional:       General: He is not in acute distress.     Appearance: He is not diaphoretic.   HENT:      Head: Normocephalic and atraumatic.   Eyes:      General: No scleral icterus.        Right eye: No discharge.         Left eye: No discharge.      Conjunctiva/sclera: Conjunctivae normal.   Neck:      Thyroid: No thyromegaly.   Cardiovascular:      Rate and Rhythm: Normal rate and regular rhythm.      Heart sounds: Normal heart sounds.      No friction rub. No gallop.   Pulmonary:      Effort: Pulmonary effort is normal. No respiratory distress.      Breath sounds: No stridor. No wheezing.   Abdominal:      General: Bowel sounds are normal.      Palpations: Abdomen is soft. There is no mass.      Tenderness: There is no abdominal tenderness. There is no guarding or rebound.   Musculoskeletal:         General: No tenderness. Normal range of motion.      Cervical back: Normal range of motion and neck supple.   Lymphadenopathy:      Cervical: No cervical adenopathy.   Skin:     General: Skin is warm.      Findings: No erythema or rash.   Neurological:      Mental Status: He is alert and oriented to person, place, and time.      Motor: No abnormal muscle tone.   Psychiatric:         Behavior: Behavior normal.         RECENT LABS  WBC   Date Value Ref Range Status   09/09/2024 4.7 3.4 - 10.8 x10E3/uL Final   03/18/2024 4.67 4.5 - 11.0 10*3/uL Final     RBC   Date Value Ref Range Status   09/09/2024 5.35 4.14 - 5.80 x10E6/uL Final   03/18/2024 5.29 4.5 - 5.9 10*6/uL Final     Hemoglobin   Date Value Ref Range Status   09/09/2024 17.0 13.0 - 17.7 g/dL Final   03/18/2024 16.6 13.5 - 17.5 g/dL Final      Hematocrit   Date Value Ref Range Status   09/09/2024 51.3 (H) 37.5 - 51.0 % Final   03/18/2024 47.0 41.0 - 53.0 % Final     MCV   Date Value Ref Range Status   09/09/2024 96 79 - 97 fL Final   03/18/2024 88.8 80.0 - 100.0 fL Final     MCH   Date Value Ref Range Status   09/09/2024 31.8 26.6 - 33.0 pg Final   03/18/2024 31.4 26.0 - 34.0 pg Final     MCHC   Date Value Ref Range Status   09/09/2024 33.1 31.5 - 35.7 g/dL Final   03/18/2024 35.3 31.0 - 37.0 g/dL Final     RDW   Date Value Ref Range Status   09/09/2024 12.4 11.6 - 15.4 % Final   03/18/2024 12.3 12.0 - 16.8 % Final     MPV   Date Value Ref Range Status   03/18/2024 9.0 8.4 - 12.4 fL Final     Platelets   Date Value Ref Range Status   09/09/2024 186 150 - 450 x10E3/uL Final   03/18/2024 213 140 - 440 10*3/uL Final     Neutrophil Rel %   Date Value Ref Range Status   09/09/2024 38 Not Estab. % Final   03/18/2024 43.0 (L) 45 - 80 % Final     Lymphocyte Rel %   Date Value Ref Range Status   09/09/2024 51 Not Estab. % Final   03/18/2024 46.7 15 - 50 % Final     Monocyte Rel %   Date Value Ref Range Status   09/09/2024 9 Not Estab. % Final   03/18/2024 9.0 0 - 15 % Final     Eosinophil Rel %   Date Value Ref Range Status   09/09/2024 2 Not Estab. % Final     Eosinophil %   Date Value Ref Range Status   03/18/2024 0.9 0 - 7 % Final     Basophil Rel %   Date Value Ref Range Status   09/09/2024 0 Not Estab. % Final   03/18/2024 0.2 0 - 2 % Final     Immature Grans %   Date Value Ref Range Status   03/18/2024 0.2 0.0 - 1.0 % Final     Neutrophils Absolute   Date Value Ref Range Status   09/09/2024 1.8 1.4 - 7.0 x10E3/uL Final   03/18/2024 2.01 2.0 - 8.8 10*3/uL Final     Lymphocytes Absolute   Date Value Ref Range Status   09/09/2024 2.4 0.7 - 3.1 x10E3/uL Final   03/18/2024 2.18 0.7 - 5.5 10*3/uL Final     Monocytes Absolute   Date Value Ref Range Status   09/09/2024 0.4 0.1 - 0.9 x10E3/uL Final   03/18/2024 0.42 0.0 - 1.7 10*3/uL Final     Eosinophils  Absolute   Date Value Ref Range Status   09/09/2024 0.1 0.0 - 0.4 x10E3/uL Final   03/18/2024 0.04 0.0 - 0.8 10*3/uL Final     Basophils Absolute   Date Value Ref Range Status   09/09/2024 0.0 0.0 - 0.2 x10E3/uL Final   03/18/2024 0.01 0.0 - 0.2 10*3/uL Final     Immature Grans, Absolute   Date Value Ref Range Status   03/18/2024 0.01 0.00 - 0.10 10*3/uL Final     nRBC   Date Value Ref Range Status   03/18/2024 0 0 /100(WBC) Final       Lab Results   Component Value Date    GLUCOSE 93 09/09/2024    BUN 19 09/09/2024    CREATININE 0.93 09/09/2024    BCR 20 09/09/2024    K 4.4 09/09/2024    CO2 25 09/09/2024    CALCIUM 9.6 09/09/2024    PROTENTOTREF 7.0 09/09/2024    ALBUMIN 4.8 09/09/2024    LABIL2 2.5 (H) 03/11/2024    AST 25 09/09/2024    ALT 21 09/09/2024         Assessment & Plan   There are no diagnoses linked to this encounter.    Family history of hemochromatosis  Mildly elevated serum iron but normal ferritin and iron saturation  Chronic fatigue: Patient has had workup through his PCP with normal testosterone vitamin D, B12 and thyroid function tests      Plans          I reviewed hemochromatosis in detail with him  Reviewed of his iron studies. These  do not indicate high suspicion for hemochromatosis but would need genetic evaluation to rule that out.  I have included CBC with differential, iron studies with ferritin and HFE analysis  Given the symptom complex that he has, if his workup is unremarkable will recommend or consider rheumatology referral.  Follow-up in 2 to 4 weeks to review  All questions answered        I spent 45 total minutes, face-to-face, caring for Clifford today. 90% of this time involved counseling and/or coordination of care as documented within this note.       Electronically signed by Susi Hassan MD, 01/15/25, 12:03 PM EST.

## 2025-01-16 DIAGNOSIS — M54.2 NECK PAIN: ICD-10-CM

## 2025-01-16 DIAGNOSIS — G43.101 MIGRAINE WITH AURA AND WITH STATUS MIGRAINOSUS, NOT INTRACTABLE: ICD-10-CM

## 2025-01-18 RX ORDER — IBUPROFEN 800 MG/1
800 TABLET, FILM COATED ORAL EVERY 8 HOURS PRN
Qty: 30 TABLET | Refills: 1 | Status: SHIPPED | OUTPATIENT
Start: 2025-01-18

## 2025-01-21 ENCOUNTER — TELEPHONE (OUTPATIENT)
Dept: ONCOLOGY | Facility: CLINIC | Age: 30
End: 2025-01-21

## 2025-01-21 NOTE — TELEPHONE ENCOUNTER
Caller: Clifford Calhoun    Relationship: Self    Best call back number: [phone]     What is the best time to reach you: ASAP    Who are you requesting to speak with (clinical staff, provider,  specific staff member): CLINICAL      What was the call regarding: PLEASE CALL PT TO EXPLAIN HIS LAB RESULTS, HE SEES WHERE IT SAYS DETECTED ON HIS HEMOCHROMATOSIS LAB AND NEEDS TO FIND OUT WHAT THAT MEANS, PLEASE ADVISE.

## 2025-01-22 NOTE — TELEPHONE ENCOUNTER
Spoke to pt yesterday about labs. I advised him that Dr. Hassan wants to discuss the results further at his follow-up. Pt asked for the appt date to be moved up if possible. I told him that I would discuss with Dr. Hassan's  and see what we can do. No further questions or needs at this time.

## 2025-01-23 ENCOUNTER — TELEPHONE (OUTPATIENT)
Dept: ONCOLOGY | Facility: CLINIC | Age: 30
End: 2025-01-23
Payer: COMMERCIAL

## 2025-02-03 NOTE — PROGRESS NOTES
Hematology/Oncology Outpatient Follow Up    PATIENT NAME:Clifford Calhoun  :1995  MRN: [id number]  PRIMARY CARE PHYSICIAN: Keerthi Oakley DO  REFERRING PHYSICIAN: Keerthi Oakley DO    Chief Complaint   Patient presents with    Follow-up     Family history of hemochromatosis          HISTORY OF PRESENT ILLNESS:     This is a 29-year-old male who has referred secondary to family history of hemochromatosis.  Patient said that his mother has the hemochromatosis gene and has had high iron levels.  Patient is not aware of his family with his father's hematologic history.  He is here to be screened to see whether he is a carrier for hemochromatosis gene.        He has a longtime history of fatigue, joint aches and pains and describes what appears to be patches on his beard.  He has no personal history of autoimmune disease and he is not aware of any family history of autoimmune disorders.     He has labs in 2024 his ferritin was normal at 224 serum iron saturation was slightly elevated to 59% normal being 55% but serum iron was normal.  Additional labs include testosterone which was normal at 920, vitamin D level was normal, B12 was normal thyroid function test was normal his white count was 4.7, hemoglobin 17 platelets 186 with essentially unremarkable differentials.        Patient works as an ,  He does not smoke and does not drink alcohol      2025: Patient had a CBC white count was 5.2 hemoglobin 16.7 platelets where 199 with essentially unremarkable differentials.  His iron saturation was normal at 47 serum iron was normal at 144 iron binding capacity was also normal.  Patient had HFE analysis which showed homozygote H63 mutation.  His ferritin is normal at 234     Past Medical History:   Diagnosis Date    Migraine with aura        Past Surgical History:   Procedure Laterality Date    ANKLE SURGERY Right     1st surgery (), 2nd and 3rd surgeries () repair of tendon on last 2     ELBOW FUSION Right 2000    broke elbow during fall         Current Outpatient Medications:     ibuprofen (ADVIL,MOTRIN) 800 MG tablet, Take 1 tablet by mouth Every 8 (Eight) Hours As Needed for Mild Pain. With food, Disp: 30 tablet, Rfl: 1    No Known Allergies    Family History   Problem Relation Age of Onset    Migraine headaches Mother     Other (Other) Mother         hemochromatosis carrier    No Known Problems Father     No Known Problems Sister     Aortic aneurysm Maternal Grandfather     Coronary artery disease Paternal Grandmother     Hypertension Paternal Grandfather     Coronary artery disease Paternal Grandfather     Diabetes Other        Cancer-related family history is not on file.    Social History     Tobacco Use    Smoking status: Never     Passive exposure: Never    Smokeless tobacco: Never   Vaping Use    Vaping status: Never Used   Substance Use Topics    Alcohol use: Yes     Comment: 1x week, 6 drinks per sitting    Drug use: Never       I have reviewed and confirmed the accuracy of the patient's history: Chief complaint, HPI, ROS, and Subjective as entered by the MA/LPN/RN. Susi Hassan MD 02/05/25      SUBJECTIVE:     Is here today to review the results of his workup    REVIEW OF SYSTEMS:  Review of Systems   Constitutional:  Negative for chills, fatigue and fever.   HENT:  Negative for congestion, drooling, ear discharge, rhinorrhea, sinus pressure and tinnitus.    Eyes:  Negative for photophobia, pain and discharge.   Respiratory:  Negative for apnea, choking and stridor.    Cardiovascular:  Negative for palpitations.   Gastrointestinal:  Negative for abdominal distention, abdominal pain and anal bleeding.   Endocrine: Negative for polydipsia and polyphagia.   Genitourinary:  Negative for decreased urine volume, flank pain and genital sores.   Musculoskeletal:  Positive for arthralgias. Negative for gait problem, neck pain and neck stiffness.   Skin:  Negative for color change,  "rash and wound.   Neurological:  Negative for tremors, seizures, syncope, facial asymmetry and speech difficulty.   Hematological:  Negative for adenopathy.   Psychiatric/Behavioral:  Negative for agitation, confusion, hallucinations and self-injury. The patient is not hyperactive.        OBJECTIVE:    Vitals:    02/05/25 0904   BP: 134/80   Pulse: 83   Resp: 16   Temp: 98 °F (36.7 °C)   TempSrc: Infrared   Weight: 89.8 kg (198 lb)   Height: 175.3 cm (69\")   PainSc: 0-No pain     Body mass index is 29.24 kg/m².    ECOG  (0) Fully active, able to carry on all predisease performance without restriction    Physical Exam  Vitals and nursing note reviewed.   Constitutional:       General: He is not in acute distress.     Appearance: He is not diaphoretic.   HENT:      Head: Normocephalic and atraumatic.   Eyes:      General: No scleral icterus.        Right eye: No discharge.         Left eye: No discharge.      Conjunctiva/sclera: Conjunctivae normal.   Neck:      Thyroid: No thyromegaly.   Cardiovascular:      Rate and Rhythm: Normal rate and regular rhythm.      Heart sounds: Normal heart sounds.      No friction rub. No gallop.   Pulmonary:      Effort: Pulmonary effort is normal. No respiratory distress.      Breath sounds: No stridor. No wheezing.   Abdominal:      General: Bowel sounds are normal.      Palpations: Abdomen is soft. There is no mass.      Tenderness: There is no abdominal tenderness. There is no guarding or rebound.   Musculoskeletal:         General: No tenderness. Normal range of motion.      Cervical back: Normal range of motion and neck supple.   Lymphadenopathy:      Cervical: No cervical adenopathy.   Skin:     General: Skin is warm.      Findings: No erythema or rash.   Neurological:      Mental Status: He is alert and oriented to person, place, and time.      Motor: No abnormal muscle tone.   Psychiatric:         Behavior: Behavior normal.         RECENT LABS  WBC   Date Value Ref Range " Status   09/09/2024 4.7 3.4 - 10.8 x10E3/uL Final   03/18/2024 4.67 4.5 - 11.0 10*3/uL Final     RBC   Date Value Ref Range Status   09/09/2024 5.35 4.14 - 5.80 x10E6/uL Final   03/18/2024 5.29 4.5 - 5.9 10*6/uL Final     Hemoglobin   Date Value Ref Range Status   09/09/2024 17.0 13.0 - 17.7 g/dL Final   03/18/2024 16.6 13.5 - 17.5 g/dL Final     Hematocrit   Date Value Ref Range Status   09/09/2024 51.3 (H) 37.5 - 51.0 % Final   03/18/2024 47.0 41.0 - 53.0 % Final     MCV   Date Value Ref Range Status   09/09/2024 96 79 - 97 fL Final   03/18/2024 88.8 80.0 - 100.0 fL Final     MCH   Date Value Ref Range Status   09/09/2024 31.8 26.6 - 33.0 pg Final   03/18/2024 31.4 26.0 - 34.0 pg Final     MCHC   Date Value Ref Range Status   09/09/2024 33.1 31.5 - 35.7 g/dL Final   03/18/2024 35.3 31.0 - 37.0 g/dL Final     RDW   Date Value Ref Range Status   09/09/2024 12.4 11.6 - 15.4 % Final   03/18/2024 12.3 12.0 - 16.8 % Final     MPV   Date Value Ref Range Status   03/18/2024 9.0 8.4 - 12.4 fL Final     Platelets   Date Value Ref Range Status   09/09/2024 186 150 - 450 x10E3/uL Final   03/18/2024 213 140 - 440 10*3/uL Final     Neutrophil Rel %   Date Value Ref Range Status   09/09/2024 38 Not Estab. % Final   03/18/2024 43.0 (L) 45 - 80 % Final     Lymphocyte Rel %   Date Value Ref Range Status   09/09/2024 51 Not Estab. % Final   03/18/2024 46.7 15 - 50 % Final     Monocyte Rel %   Date Value Ref Range Status   09/09/2024 9 Not Estab. % Final   03/18/2024 9.0 0 - 15 % Final     Eosinophil Rel %   Date Value Ref Range Status   09/09/2024 2 Not Estab. % Final     Eosinophil %   Date Value Ref Range Status   03/18/2024 0.9 0 - 7 % Final     Basophil Rel %   Date Value Ref Range Status   09/09/2024 0 Not Estab. % Final   03/18/2024 0.2 0 - 2 % Final     Immature Grans %   Date Value Ref Range Status   03/18/2024 0.2 0.0 - 1.0 % Final     Neutrophils Absolute   Date Value Ref Range Status   09/09/2024 1.8 1.4 - 7.0 x10E3/uL  Final   03/18/2024 2.01 2.0 - 8.8 10*3/uL Final     Lymphocytes Absolute   Date Value Ref Range Status   09/09/2024 2.4 0.7 - 3.1 x10E3/uL Final   03/18/2024 2.18 0.7 - 5.5 10*3/uL Final     Monocytes Absolute   Date Value Ref Range Status   09/09/2024 0.4 0.1 - 0.9 x10E3/uL Final   03/18/2024 0.42 0.0 - 1.7 10*3/uL Final     Eosinophils Absolute   Date Value Ref Range Status   09/09/2024 0.1 0.0 - 0.4 x10E3/uL Final   03/18/2024 0.04 0.0 - 0.8 10*3/uL Final     Basophils Absolute   Date Value Ref Range Status   09/09/2024 0.0 0.0 - 0.2 x10E3/uL Final   03/18/2024 0.01 0.0 - 0.2 10*3/uL Final     Immature Grans, Absolute   Date Value Ref Range Status   03/18/2024 0.01 0.00 - 0.10 10*3/uL Final     nRBC   Date Value Ref Range Status   03/18/2024 0 0 /100(WBC) Final       Lab Results   Component Value Date    GLUCOSE 93 09/09/2024    BUN 19 09/09/2024    CREATININE 0.93 09/09/2024    BCR 20 09/09/2024    K 4.4 09/09/2024    CO2 25 09/09/2024    CALCIUM 9.6 09/09/2024    PROTENTOTREF 7.0 09/09/2024    ALBUMIN 4.8 09/09/2024    LABIL2 2.5 (H) 03/11/2024    AST 25 09/09/2024    ALT 21 09/09/2024         Assessment & Plan     There are no diagnoses linked to this encounter.    ASSESSMENT:      Homozygote mutation for H63D gene  Iron parameters, ferritin 234 reviewed.  Will obtain MRI of the liver to evaluate liver.  Check iron content in the liver  Family history of hemochromatosis  Chronic fatigue: Patient has had workup through his PCP with normal testosterone vitamin D, B12 and thyroid function tests          Discussion      Discussed hemochromatosis.  Discussed that this is inherited and therefore his blood relatives will have this mutation and be at increased risk for excess iron absorption increased risk for liver, pancreatic, cardiac and musculoskeletal complications.    Discussed in detail the health risks associated.  Discussed the management which will be reducing iron intake, phlebotomies to keep his ferritin  at 50 or less.    Discussed need for baseline MRI of the liver to assess for iron content            Plans              I reviewed hemochromatosis in detail with him  Schedule MRI of the liver to evaluate for iron content, diagnosis hemochromatosis.   Please print out copies of his HFE analysis for him.   Schedule phlebotomies at [facility] outpatient. Phlebotomize 500 cc of whole blood every 2 weeks for hemoglobin of more than 12 g per DL and ferritin of more than 50,   Reviewed need to stay hydrated to be able to tolerate phlebotomies  refer to dietary to review low iron diet for him with Maji,   follow up in 6 weeks   All questions answered              Electronically signed by Susi Hassan MD, 02/05/25, 5:16 PM EST.

## 2025-02-05 ENCOUNTER — OFFICE VISIT (OUTPATIENT)
Dept: ONCOLOGY | Facility: CLINIC | Age: 30
End: 2025-02-05
Payer: COMMERCIAL

## 2025-02-05 VITALS
DIASTOLIC BLOOD PRESSURE: 80 MMHG | RESPIRATION RATE: 16 BRPM | TEMPERATURE: 98 F | WEIGHT: 198 LBS | BODY MASS INDEX: 29.33 KG/M2 | HEART RATE: 83 BPM | HEIGHT: 69 IN | SYSTOLIC BLOOD PRESSURE: 134 MMHG

## 2025-02-05 DIAGNOSIS — E83.110 HEREDITARY HEMOCHROMATOSIS: Primary | ICD-10-CM

## 2025-02-05 NOTE — LETTER
2025     Keerthi Oakley DO  [address]  [address]  [place] [address]    Patient: Clifford Calhoun   YOB: 1995   Date of Visit: 2025     Dear Keerthi Oakley DO:       Thank you for referring Clifford Calhoun to me for evaluation. Below are the relevant portions of my assessment and plan of care.    If you have questions, please do not hesitate to call me. I look forward to following Clifford along with you.         Sincerely,        Susi Hassan MD        CC: No Recipients    Susi Hassan MD  [phone]  Sign when Signing Visit   Hematology/Oncology Outpatient Follow Up    PATIENT NAME:Clifford Calhoun  :1995  MRN: [id number]  PRIMARY CARE PHYSICIAN: Keerthi Oakley DO  REFERRING PHYSICIAN: Keerthi Oakley DO    Chief Complaint   Patient presents with   • Follow-up     Family history of hemochromatosis          HISTORY OF PRESENT ILLNESS:     This is a 29-year-old male who has referred secondary to family history of hemochromatosis.  Patient said that his mother has the hemochromatosis gene and has had high iron levels.  Patient is not aware of his family with his father's hematologic history.  He is here to be screened to see whether he is a carrier for hemochromatosis gene.        He has a longtime history of fatigue, joint aches and pains and describes what appears to be patches on his beard.  He has no personal history of autoimmune disease and he is not aware of any family history of autoimmune disorders.     He has labs in 2024 his ferritin was normal at 224 serum iron saturation was slightly elevated to 59% normal being 55% but serum iron was normal.  Additional labs include testosterone which was normal at 920, vitamin D level was normal, B12 was normal thyroid function test was normal his white count was 4.7, hemoglobin 17 platelets 186 with essentially unremarkable differentials.        Patient works as an ,  He does not smoke and  does not drink alcohol      1/21/2025: Patient had a CBC white count was 5.2 hemoglobin 16.7 platelets where 199 with essentially unremarkable differentials.  His iron saturation was normal at 47 serum iron was normal at 144 iron binding capacity was also normal.  Patient had HFE analysis which showed homozygote H63 mutation.  His ferritin is normal at 234     Past Medical History:   Diagnosis Date   • Migraine with aura        Past Surgical History:   Procedure Laterality Date   • ANKLE SURGERY Right     1st surgery (2011), 2nd and 3rd surgeries (2015) repair of tendon on last 2   • ELBOW FUSION Right 2000    broke elbow during fall         Current Outpatient Medications:   •  ibuprofen (ADVIL,MOTRIN) 800 MG tablet, Take 1 tablet by mouth Every 8 (Eight) Hours As Needed for Mild Pain. With food, Disp: 30 tablet, Rfl: 1    No Known Allergies    Family History   Problem Relation Age of Onset   • Migraine headaches Mother    • Other (Other) Mother         hemochromatosis carrier   • No Known Problems Father    • No Known Problems Sister    • Aortic aneurysm Maternal Grandfather    • Coronary artery disease Paternal Grandmother    • Hypertension Paternal Grandfather    • Coronary artery disease Paternal Grandfather    • Diabetes Other        Cancer-related family history is not on file.    Social History     Tobacco Use   • Smoking status: Never     Passive exposure: Never   • Smokeless tobacco: Never   Vaping Use   • Vaping status: Never Used   Substance Use Topics   • Alcohol use: Yes     Comment: 1x week, 6 drinks per sitting   • Drug use: Never       I have reviewed and confirmed the accuracy of the patient's history: Chief complaint, HPI, ROS, and Subjective as entered by the MA/LPN/RN. Susi Hassan MD 02/05/25      SUBJECTIVE:     Is here today to review the results of his workup    REVIEW OF SYSTEMS:  Review of Systems   Constitutional:  Negative for chills, fatigue and fever.   HENT:  Negative for  "congestion, drooling, ear discharge, rhinorrhea, sinus pressure and tinnitus.    Eyes:  Negative for photophobia, pain and discharge.   Respiratory:  Negative for apnea, choking and stridor.    Cardiovascular:  Negative for palpitations.   Gastrointestinal:  Negative for abdominal distention, abdominal pain and anal bleeding.   Endocrine: Negative for polydipsia and polyphagia.   Genitourinary:  Negative for decreased urine volume, flank pain and genital sores.   Musculoskeletal:  Positive for arthralgias. Negative for gait problem, neck pain and neck stiffness.   Skin:  Negative for color change, rash and wound.   Neurological:  Negative for tremors, seizures, syncope, facial asymmetry and speech difficulty.   Hematological:  Negative for adenopathy.   Psychiatric/Behavioral:  Negative for agitation, confusion, hallucinations and self-injury. The patient is not hyperactive.        OBJECTIVE:    Vitals:    02/05/25 0904   BP: 134/80   Pulse: 83   Resp: 16   Temp: 98 °F (36.7 °C)   TempSrc: Infrared   Weight: 89.8 kg (198 lb)   Height: 175.3 cm (69\")   PainSc: 0-No pain     Body mass index is 29.24 kg/m².    ECOG  (0) Fully active, able to carry on all predisease performance without restriction    Physical Exam  Vitals and nursing note reviewed.   Constitutional:       General: He is not in acute distress.     Appearance: He is not diaphoretic.   HENT:      Head: Normocephalic and atraumatic.   Eyes:      General: No scleral icterus.        Right eye: No discharge.         Left eye: No discharge.      Conjunctiva/sclera: Conjunctivae normal.   Neck:      Thyroid: No thyromegaly.   Cardiovascular:      Rate and Rhythm: Normal rate and regular rhythm.      Heart sounds: Normal heart sounds.      No friction rub. No gallop.   Pulmonary:      Effort: Pulmonary effort is normal. No respiratory distress.      Breath sounds: No stridor. No wheezing.   Abdominal:      General: Bowel sounds are normal.      Palpations: " Abdomen is soft. There is no mass.      Tenderness: There is no abdominal tenderness. There is no guarding or rebound.   Musculoskeletal:         General: No tenderness. Normal range of motion.      Cervical back: Normal range of motion and neck supple.   Lymphadenopathy:      Cervical: No cervical adenopathy.   Skin:     General: Skin is warm.      Findings: No erythema or rash.   Neurological:      Mental Status: He is alert and oriented to person, place, and time.      Motor: No abnormal muscle tone.   Psychiatric:         Behavior: Behavior normal.         RECENT LABS  WBC   Date Value Ref Range Status   09/09/2024 4.7 3.4 - 10.8 x10E3/uL Final   03/18/2024 4.67 4.5 - 11.0 10*3/uL Final     RBC   Date Value Ref Range Status   09/09/2024 5.35 4.14 - 5.80 x10E6/uL Final   03/18/2024 5.29 4.5 - 5.9 10*6/uL Final     Hemoglobin   Date Value Ref Range Status   09/09/2024 17.0 13.0 - 17.7 g/dL Final   03/18/2024 16.6 13.5 - 17.5 g/dL Final     Hematocrit   Date Value Ref Range Status   09/09/2024 51.3 (H) 37.5 - 51.0 % Final   03/18/2024 47.0 41.0 - 53.0 % Final     MCV   Date Value Ref Range Status   09/09/2024 96 79 - 97 fL Final   03/18/2024 88.8 80.0 - 100.0 fL Final     MCH   Date Value Ref Range Status   09/09/2024 31.8 26.6 - 33.0 pg Final   03/18/2024 31.4 26.0 - 34.0 pg Final     MCHC   Date Value Ref Range Status   09/09/2024 33.1 31.5 - 35.7 g/dL Final   03/18/2024 35.3 31.0 - 37.0 g/dL Final     RDW   Date Value Ref Range Status   09/09/2024 12.4 11.6 - 15.4 % Final   03/18/2024 12.3 12.0 - 16.8 % Final     MPV   Date Value Ref Range Status   03/18/2024 9.0 8.4 - 12.4 fL Final     Platelets   Date Value Ref Range Status   09/09/2024 186 150 - 450 x10E3/uL Final   03/18/2024 213 140 - 440 10*3/uL Final     Neutrophil Rel %   Date Value Ref Range Status   09/09/2024 38 Not Estab. % Final   03/18/2024 43.0 (L) 45 - 80 % Final     Lymphocyte Rel %   Date Value Ref Range Status   09/09/2024 51 Not Estab. %  Final   03/18/2024 46.7 15 - 50 % Final     Monocyte Rel %   Date Value Ref Range Status   09/09/2024 9 Not Estab. % Final   03/18/2024 9.0 0 - 15 % Final     Eosinophil Rel %   Date Value Ref Range Status   09/09/2024 2 Not Estab. % Final     Eosinophil %   Date Value Ref Range Status   03/18/2024 0.9 0 - 7 % Final     Basophil Rel %   Date Value Ref Range Status   09/09/2024 0 Not Estab. % Final   03/18/2024 0.2 0 - 2 % Final     Immature Grans %   Date Value Ref Range Status   03/18/2024 0.2 0.0 - 1.0 % Final     Neutrophils Absolute   Date Value Ref Range Status   09/09/2024 1.8 1.4 - 7.0 x10E3/uL Final   03/18/2024 2.01 2.0 - 8.8 10*3/uL Final     Lymphocytes Absolute   Date Value Ref Range Status   09/09/2024 2.4 0.7 - 3.1 x10E3/uL Final   03/18/2024 2.18 0.7 - 5.5 10*3/uL Final     Monocytes Absolute   Date Value Ref Range Status   09/09/2024 0.4 0.1 - 0.9 x10E3/uL Final   03/18/2024 0.42 0.0 - 1.7 10*3/uL Final     Eosinophils Absolute   Date Value Ref Range Status   09/09/2024 0.1 0.0 - 0.4 x10E3/uL Final   03/18/2024 0.04 0.0 - 0.8 10*3/uL Final     Basophils Absolute   Date Value Ref Range Status   09/09/2024 0.0 0.0 - 0.2 x10E3/uL Final   03/18/2024 0.01 0.0 - 0.2 10*3/uL Final     Immature Grans, Absolute   Date Value Ref Range Status   03/18/2024 0.01 0.00 - 0.10 10*3/uL Final     nRBC   Date Value Ref Range Status   03/18/2024 0 0 /100(WBC) Final       Lab Results   Component Value Date    GLUCOSE 93 09/09/2024    BUN 19 09/09/2024    CREATININE 0.93 09/09/2024    BCR 20 09/09/2024    K 4.4 09/09/2024    CO2 25 09/09/2024    CALCIUM 9.6 09/09/2024    PROTENTOTREF 7.0 09/09/2024    ALBUMIN 4.8 09/09/2024    LABIL2 2.5 (H) 03/11/2024    AST 25 09/09/2024    ALT 21 09/09/2024         Assessment & Plan    There are no diagnoses linked to this encounter.    ASSESSMENT:      Homozygote mutation for H63D gene  Iron parameters, ferritin 234 reviewed.  Will obtain MRI of the liver to evaluate liver.  Check  iron content in the liver  Family history of hemochromatosis  Chronic fatigue: Patient has had workup through his PCP with normal testosterone vitamin D, B12 and thyroid function tests          Discussion      Discussed hemochromatosis.  Discussed that this is inherited and therefore his blood relatives will have this mutation and be at increased risk for excess iron absorption increased risk for liver, pancreatic, cardiac and musculoskeletal complications.    Discussed in detail the health risks associated.  Discussed the management which will be reducing iron intake, phlebotomies to keep his ferritin at 50 or less.    Discussed need for baseline MRI of the liver to assess for iron content            Plans              I reviewed hemochromatosis in detail with him  Schedule MRI of the liver to evaluate for iron content, diagnosis hemochromatosis.   Please print out copies of his HFE analysis for him.   Schedule phlebotomies at [facility] outpatient. Phlebotomize 500 cc of whole blood every 2 weeks for hemoglobin of more than 12 g per DL and ferritin of more than 50,   Reviewed need to stay hydrated to be able to tolerate phlebotomies  refer to dietary to review low iron diet for him with Maji,   follow up in 6 weeks   All questions answered              Electronically signed by Susi Hassan MD, 02/05/25, 5:16 PM EST.

## 2025-02-07 ENCOUNTER — TELEPHONE (OUTPATIENT)
Dept: ONCOLOGY | Facility: CLINIC | Age: 30
End: 2025-02-07
Payer: COMMERCIAL

## 2025-02-07 NOTE — TELEPHONE ENCOUNTER
24-Hour Diet Recall:  Breakfast - scrambled egg whites and whole eggs, English muffin w/peanut butter and jelly  Lunch - ground turkey, broccoli, rice  Dinner - Mexican restaurant: tortilla soup with chicken and rice, talya   Snacks - yogurt, kodiac granola bar  Water ~64 oz/day    I called the pt to provide nutrition education for Hemochromatosis. We reviewed the foods the pt should limit and foods he can eat freely, pt verbalized understanding. Pt appreciated today's call, All questions and concerns were addressed and answered. I provided my contact information and encouraged him to call or schedule an appointment as needed.    Ursula Jewell, MS,RD,LD-[place],CD-[place]  Registered Dietitian

## 2025-02-10 ENCOUNTER — TELEPHONE (OUTPATIENT)
Dept: ONCOLOGY | Facility: CLINIC | Age: 30
End: 2025-02-10

## 2025-02-10 NOTE — TELEPHONE ENCOUNTER
Caller: Clifford Calhoun     Relationship: Self     Best call back number: [phone]      What orders are you requesting (i.e. lab or imaging): MRI     In what timeframe would the patient need to come in: BEFORE 02/27     Where will you receive your lab/imaging services: [facility]     Additional notes: THE PT'S MRI IS MORE COST EFFECTIVE THROUGH HIS INSURANCE TO HAVE IT DONE AT [facility]. PLEASE SEND ORDER FOR MRI TO [facility].     PLEASE CALL THE PT TO ADVISE WHEN THE ORDER HAS BEEN SENT TO [facility].     PT IS ALSO CALLING TO CHECK ON GETTING THE PHLEBOTOMY SET UP. PT IS WANTING TO FIND OUT COSTS AND IF HE WILL NEED TO HAVE SOME WHERE ELSE DUE TO COSTS.

## 2025-02-10 NOTE — TELEPHONE ENCOUNTER
Caller: Clifford Calhoun    Relationship: Self    Best call back number: [phone]      Who are you requesting to speak with (clinical staff, provider,  specific staff member): CLINICAL    What was the call regarding: PT ASKING HOW IMPORTANT IT IS THAT HE HAVE THE MRI ON 2/27.  INSURANCE IS NOT COVERING IT AND HE MOST LIKELY WILL NOT HAVE THE MRI.     PT ALSO FOLLOWING UP ON HIS PHLEBOTOMY SCHEDULING.     PLEASE ADVISE

## 2025-02-11 PROBLEM — E83.119 HEMOCHROMATOSIS: Status: ACTIVE | Noted: 2025-02-11

## 2025-02-11 NOTE — TELEPHONE ENCOUNTER
Returned pt's call to let him know that I sent his MRI order to [facility] per his request. We also discussed his phlebotomy appts. He voiced concern about the cost. I advised him that I would try to get an estimate on the cost of a phlebotomy appt and call him back with that information. Pt verbalized understanding.

## 2025-02-13 ENCOUNTER — TELEPHONE (OUTPATIENT)
Dept: ONCOLOGY | Facility: CLINIC | Age: 30
End: 2025-02-13

## 2025-02-13 NOTE — TELEPHONE ENCOUNTER
Called pt to let him know that I spoke to Jesus about an estimate for the phlebotomies. I told him that Jesus gave an estimate of $535, but said that his insurance should cover them. He verbalized understanding and was agreeable to scheduling. I told him I would send a message to Dianna and she would reach out to him to schedule.

## 2025-02-13 NOTE — TELEPHONE ENCOUNTER
Caller: Clifford Calhoun    Relationship: Self    Best call back number: [phone]    What is the best time to reach you: ASAP     Who are you requesting to speak with (clinical staff, provider,  specific staff member): POLLY SHEPARD     What was the call regarding: POLLY PT NEEDS TO KNOW % THAT HIS PHLEBOTOMY WILL BE COVERED SO THAT HE CAN SCHEDULE FOR TOMORROW(2/14/25), IF SO YOU CAN LET A  KNOW & HAVE THEM CALL TO GET HIM SCHEDULED.    Is it okay if the provider responds through MyChart: NO

## 2025-02-14 ENCOUNTER — TELEPHONE (OUTPATIENT)
Dept: ONCOLOGY | Facility: CLINIC | Age: 30
End: 2025-02-14
Payer: COMMERCIAL

## 2025-02-14 ENCOUNTER — TELEPHONE (OUTPATIENT)
Dept: ONCOLOGY | Facility: CLINIC | Age: 30
End: 2025-02-14

## 2025-02-14 NOTE — TELEPHONE ENCOUNTER
Caller: Clifford Calhoun    Relationship: Self    Best call back number: [phone]    What form or medical record are you requesting: PT IS NEEDING AN EMAIL SO HE CAN EMAIL PAPERWORK FROM  [facility] WHO WILL DO HIS PHLEBOTOMIES FOR FREE BUT THIS PAPERWORK NEEDS FILLED OUT BY DR. WALLACE & PT WOULD LIKE TO EMAIL IT OVER.    Who is requesting this form or medical record from you: [facility] - PH: [phone]    How would you like to receive the form or medical records (pick-up, mail, fax): FAX  If fax, what is the fax number: [phone]-    Timeframe paperwork needed: ASAP

## 2025-02-14 NOTE — TELEPHONE ENCOUNTER
Spoke with patient and let him know the cost of his Phlebotomy ( $401.75 ), patient stated that he cannot pay that each time. I let him know that he can get this done at a blood drive and if not to call back to see if Jesus can apply for foundation assistance. Patient voiced understanding

## 2025-02-26 ENCOUNTER — TELEPHONE (OUTPATIENT)
Dept: FAMILY MEDICINE CLINIC | Facility: CLINIC | Age: 30
End: 2025-02-26
Payer: COMMERCIAL

## 2025-02-26 ENCOUNTER — OFFICE VISIT (OUTPATIENT)
Dept: FAMILY MEDICINE CLINIC | Facility: CLINIC | Age: 30
End: 2025-02-26
Payer: COMMERCIAL

## 2025-02-26 VITALS
TEMPERATURE: 97.5 F | DIASTOLIC BLOOD PRESSURE: 76 MMHG | OXYGEN SATURATION: 98 % | RESPIRATION RATE: 18 BRPM | HEIGHT: 69 IN | SYSTOLIC BLOOD PRESSURE: 120 MMHG | WEIGHT: 195 LBS | BODY MASS INDEX: 28.88 KG/M2 | HEART RATE: 101 BPM

## 2025-02-26 DIAGNOSIS — J01.01 ACUTE RECURRENT MAXILLARY SINUSITIS: Primary | ICD-10-CM

## 2025-02-26 DIAGNOSIS — E66.3 OVERWEIGHT WITH BODY MASS INDEX (BMI) OF 28 TO 28.9 IN ADULT: ICD-10-CM

## 2025-02-26 DIAGNOSIS — J06.9 VIRAL UPPER RESPIRATORY TRACT INFECTION: ICD-10-CM

## 2025-02-26 LAB
EXPIRATION DATE: NORMAL
FLUAV AG UPPER RESP QL IA.RAPID: NOT DETECTED
FLUBV AG UPPER RESP QL IA.RAPID: NOT DETECTED
INTERNAL CONTROL: NORMAL
Lab: NORMAL
SARS-COV-2 AG UPPER RESP QL IA.RAPID: NOT DETECTED

## 2025-02-26 PROCEDURE — 99213 OFFICE O/P EST LOW 20 MIN: CPT | Performed by: FAMILY MEDICINE

## 2025-02-26 PROCEDURE — 87428 SARSCOV & INF VIR A&B AG IA: CPT | Performed by: FAMILY MEDICINE

## 2025-02-26 RX ORDER — DOXYCYCLINE 100 MG/1
100 CAPSULE ORAL 2 TIMES DAILY
Qty: 20 CAPSULE | Refills: 0 | Status: SHIPPED | OUTPATIENT
Start: 2025-02-26

## 2025-02-26 NOTE — PROGRESS NOTES
"Chief Complaint  URI    Subjective     CC  Problem List  Visit Diagnosis   Encounters  Notes  Medications  Labs  Result Review Imaging  Media    Clifford Calhoun presents to [facility] for   URI   This is a new problem. The current episode started in the past 7 days. There has been no fever. Associated symptoms include congestion, coughing, headaches, joint pain, nausea, rhinorrhea and sinus pain. Pertinent negatives include no abdominal pain, chest pain, diarrhea, ear pain, plugged ear sensation, rash, sore throat or wheezing. He has tried decongestant for the symptoms. The treatment provided no relief.       Review of Systems   Constitutional:  Negative for appetite change, fever and unexpected weight loss.   HENT:  Positive for congestion, rhinorrhea and sinus pressure. Negative for ear pain and sore throat.    Respiratory:  Positive for cough. Negative for shortness of breath and wheezing.    Cardiovascular:  Negative for chest pain and palpitations.   Gastrointestinal:  Positive for nausea. Negative for abdominal pain and diarrhea.   Endocrine: Negative for cold intolerance, heat intolerance, polydipsia and polyuria.   Musculoskeletal:  Positive for joint pain and myalgias.   Skin:  Negative for rash.   Hematological:  Negative for adenopathy. Does not bruise/bleed easily.        Objective   Vital Signs:   /76 (BP Location: Right arm, Patient Position: Sitting, Cuff Size: Adult)   Pulse 101   Temp 97.5 °F (36.4 °C) (Temporal)   Resp 18   Ht 175.3 cm (69\")   Wt 88.5 kg (195 lb)   SpO2 98%   BMI 28.80 kg/m²     Physical Exam  Constitutional:       General: He is not in acute distress.  HENT:      Right Ear: Tympanic membrane normal.      Left Ear: Tympanic membrane normal.      Nose:      Right Sinus: Maxillary sinus tenderness present.      Left Sinus: Maxillary sinus tenderness present.      Mouth/Throat:      Pharynx: No oropharyngeal exudate or posterior " oropharyngeal erythema (moderate post nasal secretions.).   Cardiovascular:      Rate and Rhythm: Normal rate and regular rhythm.      Heart sounds: No murmur heard.  Pulmonary:      Effort: Pulmonary effort is normal.      Breath sounds: Normal breath sounds.   Musculoskeletal:      Cervical back: Neck supple.   Lymphadenopathy:      Cervical: Cervical adenopathy (small anterior nodes.) present.   Skin:     Findings: No rash.   Neurological:      Mental Status: He is alert.        Result Review :Labs  Result Review  Imaging  Med Tab  Media                 Assessment and Plan CC Problem List  Visit Diagnosis  ROS  Review (Popup)  Health Maintenance  Quality  BestPractice  Medications  SmartSets  SnapShot Encounters  Media  Problem List Items Addressed This Visit          Unprioritized    Overweight with body mass index (BMI) of 28 to 28.9 in adult    Current Assessment & Plan     Patient's (Body mass index is 28.8 kg/m².) indicates that they are overweight with health conditions that include none . Weight is unchanged. BMI is above average; BMI management plan is completed. We discussed portion control and increasing exercise.           Other Visit Diagnoses       Acute recurrent maxillary sinusitis    -  Primary    Relevant Medications    doxycycline (MONODOX) 100 MG capsule    Viral upper respiratory tract infection        neg flu neg covid    Relevant Orders    POCT SARS-CoV-2 + Flu Antigen JULIA (Completed)            Follow Up Instructions Charge Capture  Follow-up Communications  Return if symptoms worsen or fail to improve.  Patient was given instructions and counseling regarding his condition or for health maintenance advice. Please see specific information pulled into the AVS if appropriate.

## 2025-02-26 NOTE — LETTER
March 1, 2025     Patient: Clifford Calhoun   YOB: 1995   Date of Visit: 2/26/2025       To Whom It May Concern:    It is my medical opinion that Clifford Calhoun was seen today and unable to work 02/25 and 02/26/2025.            Sincerely,        Blanca Farr MD    CC: No Recipients

## 2025-02-26 NOTE — TELEPHONE ENCOUNTER
Caller: Clifford Calhoun    Relationship: Self    Best call back number: [phone]      What medication are you requesting:WHATEVER DR. ERNST RECOMMENDS     What are your current symptoms: COUGH BODY ACHES HEADACHE AND CHILLS. HIS LUNGS FEELS SORE FROM ALL THE COUGHING     How long have you been experiencing symptoms: 2/21 GETTING WORSE    Have you had these symptoms before:    [] Yes  [x] No    Have you been treated for these symptoms before:   [] Yes  [x] No    If a prescription is needed, what is your preferred pharmacy and phone number: [id number] - [place], [place] - [address] - [phone] [facility] [phone] FX     Additional notes:  PLEASE CALL TO CONFIRM OR DENY

## 2025-03-02 NOTE — ASSESSMENT & PLAN NOTE
Patient's (Body mass index is 28.8 kg/m².) indicates that they are overweight with health conditions that include none . Weight is unchanged. BMI is above average; BMI management plan is completed. We discussed portion control and increasing exercise.

## 2025-03-13 ENCOUNTER — TELEPHONE (OUTPATIENT)
Dept: ONCOLOGY | Facility: CLINIC | Age: 30
End: 2025-03-13

## 2025-03-13 NOTE — TELEPHONE ENCOUNTER
Caller: Clifford Calhoun    Relationship: Self    Best call back number:[phone]    What test was performed: MRI     When was the test performed: 2/19/25 OR AROUND THAT DATE.    Where was the test performed: [facility]

## 2025-03-21 NOTE — TELEPHONE ENCOUNTER
Caller: Clifford Calhoun    Relationship: Self    Best call back number: [phone]     What is the best time to reach you: ASAP    Who are you requesting to speak with (clinical staff, provider,  specific staff member): CLINICAL      What was the call regarding: PLEASE CALL PT TO ADVISE IF THE MRI REPORT HAS BEEN RECEIVED YET.

## 2025-03-27 NOTE — TELEPHONE ENCOUNTER
Caller: Clifford Calhoun    Relationship: Self    Best call back number: [phone]     What is the best time to reach you: ANYTIME    Who are you requesting to speak with (clinical staff, provider,  specific staff member): CLINICAL        What was the call regarding: PT IS CALLING BACK TO GET THE MRI RESULTS SENT FROM [facility], PLEASE ADVISE.

## 2025-04-02 ENCOUNTER — TELEPHONE (OUTPATIENT)
Dept: ONCOLOGY | Facility: CLINIC | Age: 30
End: 2025-04-02
Payer: COMMERCIAL

## 2025-04-02 NOTE — TELEPHONE ENCOUNTER
Caller: Clifford Calhoun    Relationship to patient: Self    Best call back number: [phone]    Type of visit: FU1    Requested date: PUSH TO MID MAY, IN [place]     If rescheduling, when is the original appointment: 4/2 (CAN BY PT)

## 2025-04-14 ENCOUNTER — TELEPHONE (OUTPATIENT)
Dept: ONCOLOGY | Facility: CLINIC | Age: 30
End: 2025-04-14
Payer: COMMERCIAL

## 2025-04-14 DIAGNOSIS — Z83.49 FAMILY HISTORY OF HEMOCHROMATOSIS: ICD-10-CM

## 2025-04-14 DIAGNOSIS — E83.110 HEREDITARY HEMOCHROMATOSIS: Primary | ICD-10-CM

## 2025-04-14 NOTE — TELEPHONE ENCOUNTER
Left voicemail for patient to return call to move his Hilaria appointment to  on 05/21/2025 per MD

## 2025-05-14 DIAGNOSIS — J01.01 ACUTE RECURRENT MAXILLARY SINUSITIS: ICD-10-CM

## 2025-05-14 NOTE — TELEPHONE ENCOUNTER
PATIENT HAS ANOTHER SINUS INFECTION    Caller: Clifford Calhoun    Relationship: Self    Best call back number:   Telephone Information:   Mobile [phone]           Requested Prescriptions:   Requested Prescriptions     Pending Prescriptions Disp Refills    doxycycline (MONODOX) 100 MG capsule 20 capsule 0     Sig: Take 1 capsule by mouth 2 (Two) Times a Day.        Pharmacy where request should be sent: [id number] - [place], [place]  [address] - [phone]  - [phone] FX     Last office visit with prescribing clinician: 9/24/2024   Last telemedicine visit with prescribing clinician: Visit date not found   Next office visit with prescribing clinician: Visit date not found     Additional details provided by patient:     Does the patient have less than a 3 day supply:  [x] Yes  [] No    Would you like a call back once the refill request has been completed: [] Yes [] No    If the office needs to give you a call back, can they leave a voicemail: [] Yes [] No    Estelle Bravo Rep   05/14/25 09:13 EDT

## 2025-05-15 ENCOUNTER — TELEPHONE (OUTPATIENT)
Dept: FAMILY MEDICINE CLINIC | Facility: CLINIC | Age: 30
End: 2025-05-15
Payer: COMMERCIAL

## 2025-05-15 RX ORDER — DOXYCYCLINE 100 MG/1
100 CAPSULE ORAL 2 TIMES DAILY
Qty: 20 CAPSULE | Refills: 0 | OUTPATIENT
Start: 2025-05-15

## 2025-05-15 NOTE — TELEPHONE ENCOUNTER
Patient called asking why he could not have an antibiotic sent in since he was just seen in February for a sinus infection. Stated he feels it is best if he just finds another doctor because Dr. Oakley seems to busy to see him.

## 2025-05-15 NOTE — TELEPHONE ENCOUNTER
Patient called wanting 5/16 appt cancelled stating he can not come before 3:30pm due to his work schedule. He stated not to worry about finding him another appt because he was going to look for another provider that had more available appts to fit his schedule.

## 2025-05-15 NOTE — TELEPHONE ENCOUNTER
Patient was seen by same-day provider on 2/26/2025 for an upper respiratory infection.  Since it has almost been 3 months since that appointment, he should be seen.  We can fit him in tomorrow morning at 11:45 AM

## 2025-05-16 ENCOUNTER — TELEPHONE (OUTPATIENT)
Dept: ONCOLOGY | Facility: CLINIC | Age: 30
End: 2025-05-16

## 2025-05-16 NOTE — TELEPHONE ENCOUNTER
Hub staff attempted to follow warm transfer process and was unsuccessful     Caller: Clifford Calhoun    Relationship to patient: Self    Best call back number: [phone]    Patient is needing: PT NEEDING TO CANCEL HIS 5/19 APPT, HE WILL CALL BACK TO R/S